# Patient Record
Sex: MALE | Race: ASIAN | Employment: FULL TIME | URBAN - METROPOLITAN AREA
[De-identification: names, ages, dates, MRNs, and addresses within clinical notes are randomized per-mention and may not be internally consistent; named-entity substitution may affect disease eponyms.]

---

## 2017-05-22 ENCOUNTER — HOSPITAL ENCOUNTER (OUTPATIENT)
Age: 57
Discharge: HOME | End: 2017-05-22
Attending: FAMILY MEDICINE
Payer: COMMERCIAL

## 2017-05-22 VITALS
OXYGEN SATURATION: 99 % | TEMPERATURE: 98 F | WEIGHT: 183 LBS | RESPIRATION RATE: 16 BRPM | HEART RATE: 69 BPM | HEIGHT: 70 IN | SYSTOLIC BLOOD PRESSURE: 149 MMHG | DIASTOLIC BLOOD PRESSURE: 82 MMHG | BODY MASS INDEX: 26.2 KG/M2

## 2017-05-22 DIAGNOSIS — K12.0 ORAL APHTHOUS ULCER: Primary | ICD-10-CM

## 2017-05-22 PROCEDURE — 99204 OFFICE O/P NEW MOD 45 MIN: CPT

## 2017-05-22 PROCEDURE — 99213 OFFICE O/P EST LOW 20 MIN: CPT

## 2017-05-22 RX ORDER — VALACYCLOVIR HYDROCHLORIDE 1 G/1
2 TABLET, FILM COATED ORAL 2 TIMES DAILY
Qty: 8 TABLET | Refills: 0 | Status: SHIPPED | OUTPATIENT
Start: 2017-05-22 | End: 2017-05-24

## 2017-05-23 PROBLEM — B00.1 COLD SORE: Status: ACTIVE | Noted: 2017-05-23

## 2017-05-23 NOTE — ED PROVIDER NOTES
Patient Seen in: [facility]    History   Patient presents with:  Mouth Cold Sores (respiratory/integumentary)    Stated Complaint: Mouth sores    HPI    67-year-old male presents for sores in the mouth.   States he has sores to u 177.8 cm (5' 10\")  Wt 83.008 kg  BMI 26.26 kg/m2  SpO2 99%        Physical Exam   Constitutional: He is oriented to person, place, and time. He appears well-developed and well-nourished. HENT:   Head: Normocephalic and atraumatic.    Right Ear: External

## 2017-06-10 ENCOUNTER — HOSPITAL ENCOUNTER (OUTPATIENT)
Dept: GENERAL RADIOLOGY | Facility: HOSPITAL | Age: 57
Discharge: HOME | End: 2017-06-10
Attending: INTERNAL MEDICINE
Payer: COMMERCIAL

## 2017-06-10 DIAGNOSIS — R19.8 GLOBUS PHARYNGEUS: Primary | ICD-10-CM

## 2017-06-10 DIAGNOSIS — R13.10 DYSPHAGIA: ICD-10-CM

## 2017-06-10 PROCEDURE — 92611 MOTION FLUOROSCOPY/SWALLOW: CPT

## 2017-06-10 PROCEDURE — 74230 X-RAY XM SWLNG FUNCJ C+: CPT | Performed by: INTERNAL MEDICINE

## 2017-06-10 NOTE — PROGRESS NOTES
ADULT VIDEOFLUOROSCOPIC SWALLOWING STUDY    Admission Date: 6/10/2017  Evaluation Date: 06/10/2017  Radiologist: Hal Rae    Dear Dr. Adeel Andersen,  This letter is to inform you of Ramiro Nichols Videofluoroscopic Swallowing Study results and/or plan of Agreement/Understanding verbalized and all questions answered to their apparent satisfaction. Patient was concerned that he might be getting this sensation due to stress.  This writer explained that oropharyngeal dysphagia is typically not caused by stress

## 2018-01-27 ENCOUNTER — HOSPITAL ENCOUNTER (OUTPATIENT)
Dept: GENERAL RADIOLOGY | Facility: HOSPITAL | Age: 58
Discharge: HOME | End: 2018-01-27
Attending: PHYSICIAN ASSISTANT
Payer: COMMERCIAL

## 2018-01-27 DIAGNOSIS — R13.10 DYSPHAGIA, UNSPECIFIED TYPE: ICD-10-CM

## 2018-01-27 PROCEDURE — 74220 X-RAY XM ESOPHAGUS 1CNTRST: CPT | Performed by: PHYSICIAN ASSISTANT

## 2018-11-13 PROBLEM — R13.10 DYSPHAGIA: Status: ACTIVE | Noted: 2018-11-13

## 2019-08-03 ENCOUNTER — HOSPITAL ENCOUNTER (OUTPATIENT)
Facility: HOSPITAL | Age: 59
Setting detail: OBSERVATION
Discharge: ANOTHER HOSPITAL | End: 2019-08-05
Attending: EMERGENCY MEDICINE | Admitting: INTERNAL MEDICINE
Payer: COMMERCIAL

## 2019-08-03 ENCOUNTER — APPOINTMENT (OUTPATIENT)
Dept: CT IMAGING | Facility: HOSPITAL | Age: 59
End: 2019-08-03
Attending: EMERGENCY MEDICINE
Payer: COMMERCIAL

## 2019-08-03 DIAGNOSIS — R13.10 DYSPHAGIA, UNSPECIFIED TYPE: Primary | ICD-10-CM

## 2019-08-03 LAB
ALBUMIN SERPL-MCNC: 4.2 G/DL (ref 3.4–5)
ALBUMIN/GLOB SERPL: 1.2 {RATIO} (ref 1–2)
ALP LIVER SERPL-CCNC: 86 U/L (ref 45–117)
ALT SERPL-CCNC: 16 U/L (ref 16–61)
ANION GAP SERPL CALC-SCNC: 6 MMOL/L (ref 0–18)
AST SERPL-CCNC: 11 U/L (ref 15–37)
BASOPHILS # BLD AUTO: 0.05 X10(3) UL (ref 0–0.2)
BASOPHILS NFR BLD AUTO: 0.4 %
BILIRUB SERPL-MCNC: 1.1 MG/DL (ref 0.1–2)
BUN BLD-MCNC: 23 MG/DL (ref 7–18)
BUN/CREAT SERPL: 18.5 (ref 10–20)
CALCIUM BLD-MCNC: 9.6 MG/DL (ref 8.5–10.1)
CHLORIDE SERPL-SCNC: 106 MMOL/L (ref 98–112)
CO2 SERPL-SCNC: 26 MMOL/L (ref 21–32)
CREAT BLD-MCNC: 1.24 MG/DL (ref 0.7–1.3)
DEPRECATED RDW RBC AUTO: 37.4 FL (ref 35.1–46.3)
EOSINOPHIL # BLD AUTO: 0.08 X10(3) UL (ref 0–0.7)
EOSINOPHIL NFR BLD AUTO: 0.6 %
ERYTHROCYTE [DISTWIDTH] IN BLOOD BY AUTOMATED COUNT: 12.3 % (ref 11–15)
GLOBULIN PLAS-MCNC: 3.6 G/DL (ref 2.8–4.4)
GLUCOSE BLD-MCNC: 102 MG/DL (ref 70–99)
GLUCOSE BLD-MCNC: 110 MG/DL (ref 70–99)
HCT VFR BLD AUTO: 45.1 % (ref 39–53)
HGB BLD-MCNC: 16.6 G/DL (ref 13–17.5)
IMM GRANULOCYTES # BLD AUTO: 0.03 X10(3) UL (ref 0–1)
IMM GRANULOCYTES NFR BLD: 0.2 %
LIPASE SERPL-CCNC: 107 U/L (ref 73–393)
LYMPHOCYTES # BLD AUTO: 1.67 X10(3) UL (ref 1–4)
LYMPHOCYTES NFR BLD AUTO: 13 %
M PROTEIN MFR SERPL ELPH: 7.8 G/DL (ref 6.4–8.2)
MCH RBC QN AUTO: 30.9 PG (ref 26–34)
MCHC RBC AUTO-ENTMCNC: 36.8 G/DL (ref 31–37)
MCV RBC AUTO: 83.8 FL (ref 80–100)
MONOCYTES # BLD AUTO: 0.89 X10(3) UL (ref 0.1–1)
MONOCYTES NFR BLD AUTO: 6.9 %
NEUTROPHILS # BLD AUTO: 10.1 X10 (3) UL (ref 1.5–7.7)
NEUTROPHILS # BLD AUTO: 10.1 X10(3) UL (ref 1.5–7.7)
NEUTROPHILS NFR BLD AUTO: 78.9 %
OSMOLALITY SERPL CALC.SUM OF ELEC: 290 MOSM/KG (ref 275–295)
PLATELET # BLD AUTO: 157 10(3)UL (ref 150–450)
POTASSIUM SERPL-SCNC: 3.6 MMOL/L (ref 3.5–5.1)
RBC # BLD AUTO: 5.38 X10(6)UL (ref 4.3–5.7)
SODIUM SERPL-SCNC: 138 MMOL/L (ref 136–145)
TSI SER-ACNC: 0.67 MIU/ML (ref 0.36–3.74)
WBC # BLD AUTO: 12.8 X10(3) UL (ref 4–11)

## 2019-08-03 PROCEDURE — 83690 ASSAY OF LIPASE: CPT | Performed by: EMERGENCY MEDICINE

## 2019-08-03 PROCEDURE — 96360 HYDRATION IV INFUSION INIT: CPT

## 2019-08-03 PROCEDURE — 84443 ASSAY THYROID STIM HORMONE: CPT | Performed by: EMERGENCY MEDICINE

## 2019-08-03 PROCEDURE — 99285 EMERGENCY DEPT VISIT HI MDM: CPT

## 2019-08-03 PROCEDURE — 71260 CT THORAX DX C+: CPT | Performed by: EMERGENCY MEDICINE

## 2019-08-03 PROCEDURE — 85025 COMPLETE CBC W/AUTO DIFF WBC: CPT | Performed by: EMERGENCY MEDICINE

## 2019-08-03 PROCEDURE — 70491 CT SOFT TISSUE NECK W/DYE: CPT | Performed by: EMERGENCY MEDICINE

## 2019-08-03 PROCEDURE — C9113 INJ PANTOPRAZOLE SODIUM, VIA: HCPCS | Performed by: INTERNAL MEDICINE

## 2019-08-03 PROCEDURE — 80053 COMPREHEN METABOLIC PANEL: CPT | Performed by: EMERGENCY MEDICINE

## 2019-08-03 PROCEDURE — 82962 GLUCOSE BLOOD TEST: CPT

## 2019-08-03 RX ORDER — DEXTROSE MONOHYDRATE 25 G/50ML
50 INJECTION, SOLUTION INTRAVENOUS
Status: DISCONTINUED | OUTPATIENT
Start: 2019-08-03 | End: 2019-08-06

## 2019-08-03 RX ORDER — ONDANSETRON 2 MG/ML
4 INJECTION INTRAMUSCULAR; INTRAVENOUS EVERY 6 HOURS PRN
Status: DISCONTINUED | OUTPATIENT
Start: 2019-08-03 | End: 2019-08-06

## 2019-08-03 RX ORDER — SODIUM CHLORIDE 9 MG/ML
INJECTION, SOLUTION INTRAVENOUS CONTINUOUS
Status: DISCONTINUED | OUTPATIENT
Start: 2019-08-03 | End: 2019-08-06

## 2019-08-03 RX ORDER — HEPARIN SODIUM 5000 [USP'U]/ML
5000 INJECTION, SOLUTION INTRAVENOUS; SUBCUTANEOUS EVERY 8 HOURS SCHEDULED
Status: DISCONTINUED | OUTPATIENT
Start: 2019-08-03 | End: 2019-08-06

## 2019-08-03 RX ORDER — SODIUM CHLORIDE 9 MG/ML
INJECTION, SOLUTION INTRAVENOUS CONTINUOUS
Status: ACTIVE | OUTPATIENT
Start: 2019-08-03 | End: 2019-08-03

## 2019-08-03 RX ORDER — METOCLOPRAMIDE HYDROCHLORIDE 5 MG/ML
10 INJECTION INTRAMUSCULAR; INTRAVENOUS EVERY 8 HOURS PRN
Status: DISCONTINUED | OUTPATIENT
Start: 2019-08-03 | End: 2019-08-06

## 2019-08-03 NOTE — ED INITIAL ASSESSMENT (HPI)
This issues has been going on for more than a year, PT has followed up with GI, today is much worse.

## 2019-08-03 NOTE — ED NOTES
Rounded on patient; no distress noted. Denies complaints. Warm blanket provided. Await lab results for further POC.

## 2019-08-03 NOTE — ED INITIAL ASSESSMENT (HPI)
PT states he has been having issues swallowing. When he does swallow, he vomits it back up. Denies fevers or diarrhea.

## 2019-08-03 NOTE — ED PROVIDER NOTES
Patient Seen in: [facility] Emergency Department    History   Patient presents with:  Swallowing Problem (gastrointestinal)    Stated Complaint: trouble swallowing for 2 weeks, worse in the last 24hrs.  unable to eat without *    HPI    Patient is a 77.1 kg   SpO2 97%   BMI 24.39 kg/m²         Physical Exam   Constitutional: He is oriented to person, place, and time. He appears well-developed and well-nourished. HENT:   Head: Normocephalic and atraumatic.    Mouth/Throat: Oropharynx is clear and mois moderate diffuse esophageal dilatation with mild wall thickening of the mid to distal esophagus. Fluid distension of the esophagus. Please correlate clinically for GERD and esophagitis. Achalasia should also be considered.  2. Patchy subtle nonspecific b Clinical Impression:  Dysphagia, unspecified type  (primary encounter diagnosis)    Disposition:  Admit  8/3/2019  7:53 pm    Follow-up:  No follow-up provider specified.       Medications Prescribed:  Current Discharge Medication List              Pres

## 2019-08-03 NOTE — ED NOTES
Rounded on patient; updated with plan of care. Contrast screening form given to patient for completion. Denies needs at this time.

## 2019-08-04 ENCOUNTER — APPOINTMENT (OUTPATIENT)
Dept: CT IMAGING | Facility: HOSPITAL | Age: 59
End: 2019-08-04
Attending: INTERNAL MEDICINE
Payer: COMMERCIAL

## 2019-08-04 ENCOUNTER — ANESTHESIA (OUTPATIENT)
Dept: ENDOSCOPY | Facility: HOSPITAL | Age: 59
End: 2019-08-04
Payer: COMMERCIAL

## 2019-08-04 ENCOUNTER — ANESTHESIA EVENT (OUTPATIENT)
Dept: ENDOSCOPY | Facility: HOSPITAL | Age: 59
End: 2019-08-04
Payer: COMMERCIAL

## 2019-08-04 LAB
ANION GAP SERPL CALC-SCNC: 7 MMOL/L (ref 0–18)
BASOPHILS # BLD AUTO: 0.04 X10(3) UL (ref 0–0.2)
BASOPHILS NFR BLD AUTO: 0.4 %
BUN BLD-MCNC: 20 MG/DL (ref 7–18)
BUN/CREAT SERPL: 20 (ref 10–20)
CALCIUM BLD-MCNC: 8.6 MG/DL (ref 8.5–10.1)
CHLORIDE SERPL-SCNC: 111 MMOL/L (ref 98–112)
CO2 SERPL-SCNC: 24 MMOL/L (ref 21–32)
CREAT BLD-MCNC: 1 MG/DL (ref 0.7–1.3)
DEPRECATED RDW RBC AUTO: 38.2 FL (ref 35.1–46.3)
EOSINOPHIL # BLD AUTO: 0.18 X10(3) UL (ref 0–0.7)
EOSINOPHIL NFR BLD AUTO: 1.9 %
ERYTHROCYTE [DISTWIDTH] IN BLOOD BY AUTOMATED COUNT: 12.4 % (ref 11–15)
GLUCOSE BLD-MCNC: 112 MG/DL (ref 70–99)
GLUCOSE BLD-MCNC: 140 MG/DL (ref 70–99)
GLUCOSE BLD-MCNC: 98 MG/DL (ref 70–99)
GLUCOSE BLD-MCNC: 99 MG/DL (ref 70–99)
HCT VFR BLD AUTO: 42.3 % (ref 39–53)
HGB BLD-MCNC: 15 G/DL (ref 13–17.5)
IMM GRANULOCYTES # BLD AUTO: 0.02 X10(3) UL (ref 0–1)
IMM GRANULOCYTES NFR BLD: 0.2 %
LYMPHOCYTES # BLD AUTO: 1.71 X10(3) UL (ref 1–4)
LYMPHOCYTES NFR BLD AUTO: 17.8 %
MCH RBC QN AUTO: 30.2 PG (ref 26–34)
MCHC RBC AUTO-ENTMCNC: 35.5 G/DL (ref 31–37)
MCV RBC AUTO: 85.3 FL (ref 80–100)
MONOCYTES # BLD AUTO: 0.87 X10(3) UL (ref 0.1–1)
MONOCYTES NFR BLD AUTO: 9 %
NEUTROPHILS # BLD AUTO: 6.8 X10 (3) UL (ref 1.5–7.7)
NEUTROPHILS # BLD AUTO: 6.8 X10(3) UL (ref 1.5–7.7)
NEUTROPHILS NFR BLD AUTO: 70.7 %
OSMOLALITY SERPL CALC.SUM OF ELEC: 297 MOSM/KG (ref 275–295)
PLATELET # BLD AUTO: 144 10(3)UL (ref 150–450)
POTASSIUM SERPL-SCNC: 3.5 MMOL/L (ref 3.5–5.1)
PROCALCITONIN SERPL-MCNC: <0.02 NG/ML
RBC # BLD AUTO: 4.96 X10(6)UL (ref 4.3–5.7)
SODIUM SERPL-SCNC: 142 MMOL/L (ref 136–145)
WBC # BLD AUTO: 9.6 X10(3) UL (ref 4–11)

## 2019-08-04 PROCEDURE — 0D758ZZ DILATION OF ESOPHAGUS, VIA NATURAL OR ARTIFICIAL OPENING ENDOSCOPIC: ICD-10-PCS | Performed by: INTERNAL MEDICINE

## 2019-08-04 PROCEDURE — 88305 TISSUE EXAM BY PATHOLOGIST: CPT | Performed by: INTERNAL MEDICINE

## 2019-08-04 PROCEDURE — 0DB38ZX EXCISION OF LOWER ESOPHAGUS, VIA NATURAL OR ARTIFICIAL OPENING ENDOSCOPIC, DIAGNOSTIC: ICD-10-PCS | Performed by: INTERNAL MEDICINE

## 2019-08-04 PROCEDURE — 82962 GLUCOSE BLOOD TEST: CPT

## 2019-08-04 PROCEDURE — 84145 PROCALCITONIN (PCT): CPT | Performed by: INTERNAL MEDICINE

## 2019-08-04 PROCEDURE — 80048 BASIC METABOLIC PNL TOTAL CA: CPT | Performed by: INTERNAL MEDICINE

## 2019-08-04 PROCEDURE — 74177 CT ABD & PELVIS W/CONTRAST: CPT | Performed by: INTERNAL MEDICINE

## 2019-08-04 PROCEDURE — 85025 COMPLETE CBC W/AUTO DIFF WBC: CPT | Performed by: INTERNAL MEDICINE

## 2019-08-04 RX ORDER — HYDROMORPHONE HYDROCHLORIDE 1 MG/ML
0.4 INJECTION, SOLUTION INTRAMUSCULAR; INTRAVENOUS; SUBCUTANEOUS EVERY 5 MIN PRN
Status: DISCONTINUED | OUTPATIENT
Start: 2019-08-04 | End: 2019-08-04 | Stop reason: HOSPADM

## 2019-08-04 RX ORDER — ISOSORBIDE DINITRATE 10 MG/1
10 TABLET ORAL
Status: DISCONTINUED | OUTPATIENT
Start: 2019-08-04 | End: 2019-08-06

## 2019-08-04 RX ORDER — SODIUM CHLORIDE, SODIUM LACTATE, POTASSIUM CHLORIDE, CALCIUM CHLORIDE 600; 310; 30; 20 MG/100ML; MG/100ML; MG/100ML; MG/100ML
INJECTION, SOLUTION INTRAVENOUS CONTINUOUS
Status: DISCONTINUED | OUTPATIENT
Start: 2019-08-04 | End: 2019-08-04

## 2019-08-04 RX ORDER — PANTOPRAZOLE SODIUM 40 MG/1
40 TABLET, DELAYED RELEASE ORAL
Status: DISCONTINUED | OUTPATIENT
Start: 2019-08-04 | End: 2019-08-06

## 2019-08-04 RX ORDER — DEXTROSE MONOHYDRATE 25 G/50ML
50 INJECTION, SOLUTION INTRAVENOUS
Status: DISCONTINUED | OUTPATIENT
Start: 2019-08-04 | End: 2019-08-04 | Stop reason: HOSPADM

## 2019-08-04 RX ORDER — NALOXONE HYDROCHLORIDE 0.4 MG/ML
80 INJECTION, SOLUTION INTRAMUSCULAR; INTRAVENOUS; SUBCUTANEOUS AS NEEDED
Status: DISCONTINUED | OUTPATIENT
Start: 2019-08-04 | End: 2019-08-04 | Stop reason: HOSPADM

## 2019-08-04 NOTE — PROGRESS NOTES
Patient not tolerating soft diet, verbalized scrambled egg felt like it go stock in his throat. He swallowed several times then it got cleared. Will downgrade diet to full liquid, patient agreeable.   Instructed to sit up straight in bed or sit up in the c

## 2019-08-04 NOTE — PLAN OF CARE
IVF infusing per order. Denies any pain. Ambulated in pickett. NPO. Refused mouth swabs. Scds on. IS teaching done. VSS. On RA. Voiding without difficulty. Instructed to use urinal so output can be measured. Spouse at bedside. Updated on POC.

## 2019-08-04 NOTE — ED NOTES
Patient able to swallow and keep water down, but states \"it feels like it is stuck there. \" Patient speaking clearly.

## 2019-08-04 NOTE — BRIEF OP NOTE
Pre-Operative Diagnosis: dysphagia     Post-Operative Diagnosis:Normal other than esophageal narrowing and dilation     Procedure Performed:   Procedure(s):  Esophagogastroduodenoscopy with BX and dilation to 20mm    Surgeon(s) and Role:     * Ian Leger

## 2019-08-04 NOTE — PLAN OF CARE
Started on Clear liquid diet, may advance to soft diet if able to tolerate it per Dr. Ivett Taylor.   Will plan for CT of the Abdomen and Pelvis later between [id number], per CT scan it is recommended not to give oral contrast too close from the last CT scan due

## 2019-08-04 NOTE — ANESTHESIA PREPROCEDURE EVALUATION
PRE-OP EVALUATION    Patient Name: José Manuel Doty    Pre-op Diagnosis: in patient    Procedure(s):  Esophagogastroduodenoscopy with dilation    Surgeon(s) and Role:     * Yudy Rose MD - Primary    Pre-op vitals reviewed.   Temp: 97.8 °F (36.6 °C Cholecalciferol (VITAMIN D) 2000 units Oral Tab Take by mouth daily.  Disp:  Rfl:    JUSTO 128 5 % Ophthalmic Solution INT 1 GTT RODNEY BID Disp:  Rfl: 4   Timolol Maleate 0.5 % Ophthalmic Solution  Disp:  Rfl:        Allergies: Patient has no known allergies guidelines.           Plan/risks discussed with: patient and spouse                Present on Admission:  **None**

## 2019-08-04 NOTE — PROGRESS NOTES
Patient to GI lab for EGD by Dr. Jodi Contreras. Consent signed for the procedure. Transported to GI lab by stretcher accompanied by his wife.

## 2019-08-04 NOTE — OPERATIVE REPORT
PATIENT NAME: Kashif Maher  MRN: [id number]  DATE OF OPERATION:  8/4/19  REFERRING PHYSICIAN: Dr. Juan Antonio Conroy  Medications:  MAC  PREOPERATIVE DIAGNOSIS   Dysphagia   Abnormal ct of the esophagus   Weight loss  POSTOPERATIVE DIAGNOSIS:  1.  Dilation and spa eosinophilic esophagitis. The entire examined stomach,  including retroflexion view which was remarkable for normal.  The  duodenum was examined to the third portion and was normal.   The scope was then pulled back into the esophogus.     Esophogeal balloon

## 2019-08-04 NOTE — CONSULTS
Codie Monitlla MD    Department of Gastroenterology  [facility]    Mary Nichols Patient Status:  Observation    9/10/1960 MRN [id number]   [facility] 3SW-A Attending MD Lulú Lowery tonsils are symmetric. HYPOPHARYNX:  No mass or other visible lesion. LARYNX:  The vocal cords are symmetric and without mass. SINUSES:  Limited views show no significant fluid or mucosal thickening.     NECK GLANDS:  The parotid, submandibular, an Approved by: Nicky Tapia DO          Study Result     PROCEDURE:  XR ESOPHAGUS (CPT=74220)     TECHNIQUE:  An esophagram was performed with fluoroscopy in the usual manner. COMPARISON:  None.      INDICATIONS:  R13.10 Dysphagia, unspecified     KIRK PRN  •  Metoclopramide HCl (REGLAN) injection 10 mg, 10 mg, Intravenous, Q8H PRN  •  Pantoprazole Sodium (PROTONIX) 40 mg in Sodium Chloride 0.9 % 10 mL IV push, 40 mg, Intravenous, Q24H  •  glucose (DEX4) oral liquid 15 g, 15 g, Oral, Q15 Min PRN **OR** G 170 lb (77.1 kg), SpO2 96 %. General: Appears alert, oriented x3 and in no acute distress. HEENT: Normal. No neck vein distention. Thyroid not enlarged. No lymphadenopathy. CV: S1 and S2 normal.  No murmurs or gallops. Lungs: Clear to auscultation.

## 2019-08-04 NOTE — H&P
PATO Hospitalist H&P       CC: Patient presents with:  Swallowing Problem (gastrointestinal)       PCP: None Pcp    History of Present Illness:  58y/o M with hx of DM presenting to the ED with dysphagia to solids and liquids.  He has been seen by GI physic Never Used    Alcohol use: No       Fam Hx  Family History   Problem Relation Age of Onset   • Heart Attack Father    • Stroke Father    • Diabetes Mother    • Heart Attack Mother        Review of Systems  Comprehensive 10-point ROS reviewed and negative e reduction techniques were used. Dose information is transmitted to the [facility]) NRDR ([address]) which includes the Dose Index Registry.   PATIENT STATED HISTORY:(As transcribed by Technologist)    Patient i diffuse esophageal dilatation with mild wall thickening of the mid to distal esophagus. Fluid distension of the esophagus. Please correlate clinically for GERD and esophagitis. Achalasia should also be considered.  2. Patchy subtle nonspecific bilateral

## 2019-08-04 NOTE — PROGRESS NOTES
Patient back to floor from GI lab post EGD with Dilation under MAC. Received Awake alert and oriented x 4. Post-Operative Diagnosis: esophageal dilation and esophageal narrowing.   Instructed that he will be on Clear liquid diet for now and will have CT o

## 2019-08-04 NOTE — ANESTHESIA POSTPROCEDURE EVALUATION
[facility] Patient Status:  Observation   Age/Gender 63year old male MRN [id number]   [facility] 3SW-A Attending Amy Pozo MD   Hosp Day # 0 PCP None Pcp       Anesthesia Post-op Note    Procedure(s):  María

## 2019-08-05 VITALS
HEART RATE: 58 BPM | TEMPERATURE: 98 F | OXYGEN SATURATION: 99 % | DIASTOLIC BLOOD PRESSURE: 60 MMHG | HEIGHT: 70 IN | WEIGHT: 170 LBS | BODY MASS INDEX: 24.34 KG/M2 | RESPIRATION RATE: 18 BRPM | SYSTOLIC BLOOD PRESSURE: 118 MMHG

## 2019-08-05 LAB
ANION GAP SERPL CALC-SCNC: 5 MMOL/L (ref 0–18)
BUN BLD-MCNC: 16 MG/DL (ref 7–18)
BUN/CREAT SERPL: 16 (ref 10–20)
CALCIUM BLD-MCNC: 8.1 MG/DL (ref 8.5–10.1)
CHLORIDE SERPL-SCNC: 111 MMOL/L (ref 98–112)
CO2 SERPL-SCNC: 24 MMOL/L (ref 21–32)
CREAT BLD-MCNC: 1 MG/DL (ref 0.7–1.3)
GLUCOSE BLD-MCNC: 107 MG/DL (ref 70–99)
GLUCOSE BLD-MCNC: 112 MG/DL (ref 70–99)
GLUCOSE BLD-MCNC: 116 MG/DL (ref 70–99)
GLUCOSE BLD-MCNC: 123 MG/DL (ref 70–99)
OSMOLALITY SERPL CALC.SUM OF ELEC: 292 MOSM/KG (ref 275–295)
POTASSIUM SERPL-SCNC: 3.6 MMOL/L (ref 3.5–5.1)
SODIUM SERPL-SCNC: 140 MMOL/L (ref 136–145)

## 2019-08-05 PROCEDURE — 80048 BASIC METABOLIC PNL TOTAL CA: CPT | Performed by: INTERNAL MEDICINE

## 2019-08-05 PROCEDURE — 82962 GLUCOSE BLOOD TEST: CPT

## 2019-08-05 RX ORDER — ISOSORBIDE DINITRATE 10 MG/1
10 TABLET ORAL
Qty: 90 TABLET | Refills: 2 | Status: SHIPPED | OUTPATIENT
Start: 2019-08-05 | End: 2019-11-29 | Stop reason: ALTCHOICE

## 2019-08-05 NOTE — PROGRESS NOTES
[facility]  GI Progress Note      Shaniqua Devries Patient Status:  Observation    9/10/1960 MRN [id number]   [facility] 3SW-A Attending Jeronimo Beth MD   Hosp Day # 0 PCP None Pcp          SUBJECTIVE:     The patient continues fluid collection, ductal dilatation, or atrophy. SPLEEN:  No enlargement or focal lesion. KIDNEYS:  No mass, obstruction, or calcification. ADRENALS:  No mass or enlargement. AORTA/VASCULAR:  No aneurysm or dissection.     RETROPERITONEUM:  No m

## 2019-08-05 NOTE — DIETARY NOTE
[facility]    NUTRITION INITIAL ASSESSMENT    Pt does not meet malnutrition criteria.     NUTRITION DIAGNOSIS/PROBLEM:  Inadequate energy intake related to physiological causes (achalasia) as evidenced by inability to tolerate solid PO.    NUTRITION IN (25-30 calories per kg)  Protein: 77-92 grams protein/day (1-1.2 grams protein per kg)  Fluid: ~1 ml/kcal or per MD discretion    MONITOR AND EVALUATE/NUTRITION GOALS:    1. PO intake to meet at least 75% patient nutrition prescription  2.  At least 75% int

## 2019-08-05 NOTE — CM/SW NOTE
Call from Dr. Lay Whitten earlier this am that pt will need transfer to tertiary hospital for higher level of care per GI. Per GI, would prefer [facility] or [facility].      I sent insurance inforamation to  and spoke with Jose in the trans

## 2019-08-05 NOTE — PLAN OF CARE
Plan of care update discussed with patient and spouse Jutsina at bedside this am. Still c/o of dysphagia when swallowing, able to tolerate some full liquids only. No cough noted this am. Taking meds with sip of water well.  Per nghia Ordoñez to d/c to [facility]

## 2019-08-05 NOTE — PROGRESS NOTES
[facility] Hospitalist Progress Note     Rafe Dakins Patient Status:  Observation    9/10/1960 MRN [id number]   [facility] 3SW-A Attending Carmita Conn MD   Hosp Day # 0 PCP None Pcp     CC: follow up    SUBJECTIVE:  K[address] Dictated by: Juan Manuel Jade MD on 8/04/2019 at 19:01     Approved by: Juan Manuel Jade MD              Meds:   Scheduled Medication:  • Pantoprazole Sodium  40 mg Oral QAM AC   • isosorbide dinitrate  10 mg Oral TID (Nitrates)   • Heparin Sodium (Porcine)  5, transfer center at [facility] and [address] [place] [address] was discussed with patient, family at bedside, RN.     Outpatient records reviewed confirming patient's medical history and medications.      Time spent: greater than 35 minutes spent in d/w pt/family, coor

## 2019-08-05 NOTE — CM/SW NOTE
Spoke with Jamal Vann at [place] [address] transfer [place]. Pt is financially and clinically approved for transfer. No bed available at this time, hoping for later today or tomorrow.    They will contact unit directly to set up time and get RN report when be

## 2019-08-05 NOTE — PROGRESS NOTES
Rn noted d/c AVS not complete. Rn paged Dr. Patti Morales to complete at [address]. Will update noc Rn. Awaiting call from [facility] when bed becomes available. See Social Work note for d/c instructions and for Rn to call on call ambulance for transfer as per note.  Minoo

## 2019-08-05 NOTE — PLAN OF CARE
ER admit with dysphagia issues. Pt is aaox4, on room air, up SBA, IVF, SCD's refusing Heparin, esophogeal dilation done 8/4 but still having problems swallowing. Pt remains on full liquid diet. Glucose monitored and treated as directed.  Pt up SBA /Ad carolann,

## 2019-08-06 NOTE — DISCHARGE SUMMARY
General Medicine Discharge Summary     Patient ID:  Beny Hayes  63year old  9/10/1960    Admit date: 8/3/2019    Discharge date and time: 8/5/2019 10:50 PM     Attending Physician: Erlin Norris MD    Primary Care Physician: None Pcp     Reason effort  CV: RRR, nL S1/S2, no m/r/g  Abd: soft, NT/ND, BS+  MSK: moving all extremities, no LE edema  Neuro: CN II-XII grossly intact, no focal deficits  Skin: warm, dry.  No rashes/lesions  Psych: cooperative, normal mood/affect    Radiology Reports (last Historical, R-3, CARMEN    prednisoLONE acetate 1 % Ophthalmic Suspension  PLACE 1 DROP INTO BOTH EYES DAILY. , Historical    !!  Brimonidine Tartrate (ALPHAGAN P) 0.1 % Ophthalmic Solution  1 drop., Historical    MetFORMIN HCl 500 MG Oral Tab  Take 500 mg by m

## 2019-08-06 NOTE — PLAN OF CARE
Patient transferred to [facility] room 4629. Report called to Mian Watkins 7715 RN at 2030. Ambulance here at [phone]. Wife going with patient. All personal belongings with patient. Imaging disc sent with patient.

## 2019-08-07 NOTE — CM/SW NOTE
08/07/19 0800   Discharge disposition   Expected discharge disposition Short Term H   Name of [address]   Discharge transportation [address] 8/5/19

## 2019-10-07 PROBLEM — M25.522 LEFT ELBOW PAIN: Status: ACTIVE | Noted: 2019-10-07

## 2019-10-07 PROBLEM — M25.521 RIGHT ELBOW PAIN: Status: ACTIVE | Noted: 2019-10-07

## 2019-10-07 PROBLEM — M79.601 RIGHT ARM PAIN: Status: ACTIVE | Noted: 2019-10-07

## 2019-10-07 PROBLEM — M79.602 LEFT ARM PAIN: Status: ACTIVE | Noted: 2019-10-07

## 2020-11-07 PROBLEM — J30.1 SEASONAL ALLERGIC RHINITIS DUE TO POLLEN: Status: ACTIVE | Noted: 2020-11-07

## 2020-11-07 PROBLEM — B00.1 COLD SORE: Status: RESOLVED | Noted: 2017-05-23 | Resolved: 2020-11-07

## 2020-11-07 PROBLEM — M79.601 RIGHT ARM PAIN: Status: RESOLVED | Noted: 2019-10-07 | Resolved: 2020-11-07

## 2020-11-07 PROBLEM — E11.9 DIABETES TYPE 2, CONTROLLED (HCC): Status: ACTIVE | Noted: 2020-11-07

## 2020-11-07 PROBLEM — M79.602 LEFT ARM PAIN: Status: RESOLVED | Noted: 2019-10-07 | Resolved: 2020-11-07

## 2021-06-16 PROBLEM — M79.641 RIGHT HAND PAIN: Status: ACTIVE | Noted: 2021-06-16

## 2021-06-16 PROBLEM — M79.642 LEFT HAND PAIN: Status: ACTIVE | Noted: 2021-06-16

## 2024-04-04 NOTE — BRIEF OP NOTE
Pre-Operative Diagnosis: dysphagia     Post-Operative Diagnosis: esophageal dilation and esophageal narrowing      Procedure Performed:   Procedure(s):  Esophagogastroduodenoscopy with BX and dilation to 20mm    Surgeon(s) and Role:     * Noel Tierney, Universal Safety Interventions

## 2024-12-14 ENCOUNTER — APPOINTMENT (OUTPATIENT)
Dept: GENERAL RADIOLOGY | Facility: HOSPITAL | Age: 64
End: 2024-12-14
Attending: EMERGENCY MEDICINE
Payer: COMMERCIAL

## 2024-12-14 ENCOUNTER — HOSPITAL ENCOUNTER (EMERGENCY)
Facility: HOSPITAL | Age: 64
Discharge: HOME | End: 2024-12-14
Attending: EMERGENCY MEDICINE
Payer: COMMERCIAL

## 2024-12-14 VITALS
DIASTOLIC BLOOD PRESSURE: 62 MMHG | RESPIRATION RATE: 14 BRPM | SYSTOLIC BLOOD PRESSURE: 121 MMHG | TEMPERATURE: 98 F | OXYGEN SATURATION: 100 % | HEART RATE: 56 BPM

## 2024-12-14 DIAGNOSIS — R07.89 CHEST PAIN, ATYPICAL: Primary | ICD-10-CM

## 2024-12-14 LAB
ALBUMIN SERPL-MCNC: 4.4 G/DL (ref 3.2–4.8)
ALBUMIN/GLOB SERPL: 1.5 {RATIO} (ref 1–2)
ALP LIVER SERPL-CCNC: 83 U/L
ALT SERPL-CCNC: 21 U/L
ANION GAP SERPL CALC-SCNC: 11 MMOL/L (ref 0–18)
AST SERPL-CCNC: 23 U/L (ref ?–34)
BASOPHILS # BLD AUTO: 0.04 X10(3) UL (ref 0–0.2)
BASOPHILS NFR BLD AUTO: 0.5 %
BILIRUB SERPL-MCNC: 0.5 MG/DL (ref 0.2–1.1)
BUN BLD-MCNC: 20 MG/DL (ref 9–23)
CALCIUM BLD-MCNC: 9.9 MG/DL (ref 8.7–10.4)
CHLORIDE SERPL-SCNC: 105 MMOL/L (ref 98–112)
CO2 SERPL-SCNC: 22 MMOL/L (ref 21–32)
CREAT BLD-MCNC: 1.13 MG/DL
EGFRCR SERPLBLD CKD-EPI 2021: 73 ML/MIN/1.73M2 (ref 60–?)
EOSINOPHIL # BLD AUTO: 0.35 X10(3) UL (ref 0–0.7)
EOSINOPHIL NFR BLD AUTO: 4.6 %
ERYTHROCYTE [DISTWIDTH] IN BLOOD BY AUTOMATED COUNT: 13 %
GLOBULIN PLAS-MCNC: 2.9 G/DL (ref 2–3.5)
GLUCOSE BLD-MCNC: 190 MG/DL (ref 70–99)
HCT VFR BLD AUTO: 43.5 %
HGB BLD-MCNC: 15.6 G/DL
IMM GRANULOCYTES # BLD AUTO: 0.02 X10(3) UL (ref 0–1)
IMM GRANULOCYTES NFR BLD: 0.3 %
LYMPHOCYTES # BLD AUTO: 2.32 X10(3) UL (ref 1–4)
LYMPHOCYTES NFR BLD AUTO: 30.4 %
MCH RBC QN AUTO: 30.8 PG (ref 26–34)
MCHC RBC AUTO-ENTMCNC: 35.9 G/DL (ref 31–37)
MCV RBC AUTO: 86 FL
MONOCYTES # BLD AUTO: 0.65 X10(3) UL (ref 0.1–1)
MONOCYTES NFR BLD AUTO: 8.5 %
NEUTROPHILS # BLD AUTO: 4.25 X10 (3) UL (ref 1.5–7.7)
NEUTROPHILS # BLD AUTO: 4.25 X10(3) UL (ref 1.5–7.7)
NEUTROPHILS NFR BLD AUTO: 55.7 %
OSMOLALITY SERPL CALC.SUM OF ELEC: 294 MOSM/KG (ref 275–295)
PLATELET # BLD AUTO: 163 10(3)UL (ref 150–450)
POTASSIUM SERPL-SCNC: 4.2 MMOL/L (ref 3.5–5.1)
PROT SERPL-MCNC: 7.3 G/DL (ref 5.7–8.2)
RBC # BLD AUTO: 5.06 X10(6)UL
SODIUM SERPL-SCNC: 138 MMOL/L (ref 136–145)
TROPONIN I SERPL HS-MCNC: <3 NG/L
TROPONIN I SERPL HS-MCNC: <3 NG/L
WBC # BLD AUTO: 7.6 X10(3) UL (ref 4–11)

## 2024-12-14 PROCEDURE — 71045 X-RAY EXAM CHEST 1 VIEW: CPT | Performed by: EMERGENCY MEDICINE

## 2024-12-14 PROCEDURE — 84484 ASSAY OF TROPONIN QUANT: CPT | Performed by: EMERGENCY MEDICINE

## 2024-12-14 PROCEDURE — 96374 THER/PROPH/DIAG INJ IV PUSH: CPT

## 2024-12-14 PROCEDURE — 93010 ELECTROCARDIOGRAM REPORT: CPT

## 2024-12-14 PROCEDURE — 99285 EMERGENCY DEPT VISIT HI MDM: CPT

## 2024-12-14 PROCEDURE — 93005 ELECTROCARDIOGRAM TRACING: CPT

## 2024-12-14 PROCEDURE — 99284 EMERGENCY DEPT VISIT MOD MDM: CPT

## 2024-12-14 PROCEDURE — 80053 COMPREHEN METABOLIC PANEL: CPT | Performed by: EMERGENCY MEDICINE

## 2024-12-14 PROCEDURE — 85025 COMPLETE CBC W/AUTO DIFF WBC: CPT | Performed by: EMERGENCY MEDICINE

## 2024-12-14 RX ORDER — KETOROLAC TROMETHAMINE 15 MG/ML
15 INJECTION, SOLUTION INTRAMUSCULAR; INTRAVENOUS ONCE
Status: COMPLETED | OUTPATIENT
Start: 2024-12-14 | End: 2024-12-14

## 2024-12-14 RX ORDER — ASPIRIN 81 MG/1
324 TABLET, CHEWABLE ORAL ONCE
Status: COMPLETED | OUTPATIENT
Start: 2024-12-14 | End: 2024-12-14

## 2024-12-15 LAB
ATRIAL RATE: 68 BPM
P AXIS: 74 DEGREES
P-R INTERVAL: 158 MS
Q-T INTERVAL: 386 MS
QRS DURATION: 84 MS
QTC CALCULATION (BEZET): 410 MS
R AXIS: 24 DEGREES
T AXIS: 55 DEGREES
VENTRICULAR RATE: 68 BPM

## 2024-12-15 NOTE — ED PROVIDER NOTES
Patient Seen in: [facility] Emergency Department      History     Chief Complaint   Patient presents with    Chest Pain Angina     Stated Complaint: CP    Subjective:   HPI      64-year-old male comes to the hospital complaint of having difficulty chest pain.  This began after eating started about 15 to 20 minutes ago.  Is no history of having this in the past.  He denies any headaches or dizziness.  He has no shortness of breath.  He denies any nausea.  No diaphoresis.  The pain does not radiate.  There is nothing specific makes it better or worse he is denying any other complaints at this time.  He has no history of having heart disease in the past.    Objective:     Past Medical History:    Pre-diabetes    Problems with swallowing              Past Surgical History:   Procedure Laterality Date    Eye surgery                  Social History     Socioeconomic History    Marital status:    Tobacco Use    Smoking status: Never    Smokeless tobacco: Never   Substance and Sexual Activity    Alcohol use: No    Drug use: No                  Physical Exam     ED Triage Vitals   BP 12/14/24 1805 137/85   Pulse 12/14/24 1805 67   Resp 12/14/24 1805 18   Temp 12/14/24 1805 97.6 °F (36.4 °C)   Temp src 12/14/24 1805 Temporal   SpO2 12/14/24 1805 100 %   O2 Device 12/14/24 1815 None (Room air)       Current Vitals:   Vital Signs  BP: 124/62  Pulse: 58  Resp: 15  Temp: 97.6 °F (36.4 °C)  Temp src: Temporal  MAP (mmHg): 81    Oxygen Therapy  SpO2: 100 %  O2 Device: None (Room air)        Physical Exam  HEENT; NCAT, EOMI, throat clear, neck supple, no LAD, no JVD  Heart S1S2 RRR left anterior chest wall tenderness is noted  lungs: CTAB  abd: Soft NT, ND,  NABS without rebound or guarding  Ext no C/C/E    ED Course     Labs Reviewed   COMP METABOLIC PANEL (14) - Abnormal; Notable for the following components:       Result Value    Glucose 190 (*)     All other components within normal limits   TROPONIN I HIGH  SENSITIVITY - Normal   TROPONIN I HIGH SENSITIVITY - Normal   CBC WITH DIFFERENTIAL WITH PLATELET   TROPONIN I HIGH SENSITIVITY   RAINBOW DRAW LAVENDER   RAINBOW DRAW LIGHT GREEN   RAINBOW DRAW BLUE     EKG    Rate, intervals and axes as noted on EKG Report.  Rate: 68  Rhythm: Sinus Rhythm  Reading: , QRS of 84, patient is normal sinus rhythm without ischemic change.           ED Course as of 12/14/24 2039  ------------------------------------------------------------  Time: 12/14 2037  Comment: While here the patient had a normal CBC.  The patient's CMP was unremarkable with a glucose of 190.  He had 2 sets of cardiac enzymes 2 hours apart that were both negative.  The patient had a chest x-ray on that I personally interpreted showing no acute cardiopulmonary process.  I reviewed the radiology report as well.       XR CHEST AP PORTABLE  (CPT=71045)    Result Date: 12/14/2024  PROCEDURE:  XR CHEST AP PORTABLE  (CPT=71045)  TECHNIQUE:  AP chest radiograph was obtained.  COMPARISON:  None.  INDICATIONS:  CP  PATIENT STATED HISTORY: (As transcribed by Technologist)  Patient offered no additional history at this time.    FINDINGS:  Lung volumes are low.  There is dependent density in lower lungs which could represent atypical pneumonia or atelectasis.  Heart size is within normal limits.  Aorta is atherosclerotic.  Chest wall structures are unremarkable.            CONCLUSION:  There is increased density lung bases bilaterally which is favored to represent dependent atelectasis although atypical pneumonia could have this appearance.   LOCATION:  [facility]      Dictated by (CST): Lance Sanders MD on 12/14/2024 at 6:53 PM     Finalized by (CST): Lance Sanders MD on 12/14/2024 at 6:54 PM        Medications   ketorolac (Toradol) 15 MG/ML injection 15 mg (15 mg Intravenous Given 12/14/24 1814)   aspirin chewable tab 324 mg (324 mg Oral Given 12/14/24 1814)            MDM      Differential diagnosis included cardiac  involvement, pneumonia, pneumothorax but not limited to these.  Patient's chest pain is atypical.  He has had 2 sets of cardiac enzymes 2 hours apart that were both negative.  He has a low Mace score at this time the patient will be discharged home for further outpatient management care.    Patient was screened and evaluated during this visit.   As a treating physician attending to the patient, I determined, within reasonable clinical confidence and prior to discharge, that an emergency medical condition was not or was no longer present.  There was no indication for further evaluation, treatment or admission on an emergency basis.       The usual and customary discharge instuctions were discussed given the patient's ER course.  We discussed signs and symptoms that should prompt the patient's immediate return to the emergency department.   Reasonable over the counter and prescription treatment options and Physician follow up plan was discussed.       The patient is discharged in good condition.     This note was prepared using Dragon Medical voice recognition dictation software.  As a result errors may occur.  When identified to these areas have been corrected.  While every attempt is made to correct errors during dictation discrepancies may still exist.  Please contact if there are any errors.          Medical Decision Making      Disposition and Plan     Clinical Impression:  1. Chest pain, atypical         Disposition:  Discharge  12/14/2024  8:38 pm    Follow-up:  George Schrader MD  [address] [address] [phone]    Schedule an appointment as soon as possible for a visit in 3 day(s)            Medications Prescribed:  Current Discharge Medication List              Supplementary Documentation:

## 2025-02-01 ENCOUNTER — HOSPITAL ENCOUNTER (INPATIENT)
Facility: HOSPITAL | Age: 65
LOS: 1 days | Discharge: HOME | End: 2025-02-02
Attending: EMERGENCY MEDICINE | Admitting: HOSPITALIST
Payer: COMMERCIAL

## 2025-02-01 ENCOUNTER — ANESTHESIA EVENT (OUTPATIENT)
Dept: ENDOSCOPY | Facility: HOSPITAL | Age: 65
End: 2025-02-01
Payer: COMMERCIAL

## 2025-02-01 DIAGNOSIS — R13.10 DYSPHAGIA, UNSPECIFIED TYPE: Primary | ICD-10-CM

## 2025-02-01 DIAGNOSIS — R13.10 DYSPHAGIA: ICD-10-CM

## 2025-02-01 DIAGNOSIS — H40.1123 PRIMARY OPEN ANGLE GLAUCOMA (POAG) OF LEFT EYE, SEVERE STAGE: ICD-10-CM

## 2025-02-01 DIAGNOSIS — H40.32X3 TRAUMATIC GLAUCOMA, LEFT, SEVERE STAGE: ICD-10-CM

## 2025-02-01 LAB
ALBUMIN SERPL-MCNC: 4.5 G/DL (ref 3.2–4.8)
ALBUMIN/GLOB SERPL: 1.5 {RATIO} (ref 1–2)
ALP LIVER SERPL-CCNC: 78 U/L
ALT SERPL-CCNC: 23 U/L
ANION GAP SERPL CALC-SCNC: 7 MMOL/L (ref 0–18)
AST SERPL-CCNC: 20 U/L (ref ?–34)
BASOPHILS # BLD AUTO: 0.04 X10(3) UL (ref 0–0.2)
BASOPHILS NFR BLD AUTO: 0.6 %
BILIRUB SERPL-MCNC: 1.2 MG/DL (ref 0.2–1.1)
BUN BLD-MCNC: 11 MG/DL (ref 9–23)
CALCIUM BLD-MCNC: 9.7 MG/DL (ref 8.7–10.6)
CHLORIDE SERPL-SCNC: 103 MMOL/L (ref 98–112)
CO2 SERPL-SCNC: 30 MMOL/L (ref 21–32)
CREAT BLD-MCNC: 1.03 MG/DL
EGFRCR SERPLBLD CKD-EPI 2021: 82 ML/MIN/1.73M2 (ref 60–?)
EOSINOPHIL # BLD AUTO: 0.21 X10(3) UL (ref 0–0.7)
EOSINOPHIL NFR BLD AUTO: 3.2 %
ERYTHROCYTE [DISTWIDTH] IN BLOOD BY AUTOMATED COUNT: 12.7 %
EST. AVERAGE GLUCOSE BLD GHB EST-MCNC: 154 MG/DL (ref 68–126)
GLOBULIN PLAS-MCNC: 3 G/DL (ref 2–3.5)
GLUCOSE BLD-MCNC: 112 MG/DL (ref 70–99)
GLUCOSE BLD-MCNC: 136 MG/DL (ref 70–99)
GLUCOSE BLD-MCNC: 85 MG/DL (ref 70–99)
GLUCOSE BLD-MCNC: 89 MG/DL (ref 70–99)
HBA1C MFR BLD: 7 % (ref ?–5.7)
HCT VFR BLD AUTO: 44.2 %
HGB BLD-MCNC: 16.3 G/DL
IMM GRANULOCYTES # BLD AUTO: 0.01 X10(3) UL (ref 0–1)
IMM GRANULOCYTES NFR BLD: 0.2 %
LYMPHOCYTES # BLD AUTO: 1.55 X10(3) UL (ref 1–4)
LYMPHOCYTES NFR BLD AUTO: 24 %
MCH RBC QN AUTO: 30.8 PG (ref 26–34)
MCHC RBC AUTO-ENTMCNC: 36.9 G/DL (ref 31–37)
MCV RBC AUTO: 83.6 FL
MONOCYTES # BLD AUTO: 0.42 X10(3) UL (ref 0.1–1)
MONOCYTES NFR BLD AUTO: 6.5 %
NEUTROPHILS # BLD AUTO: 4.24 X10 (3) UL (ref 1.5–7.7)
NEUTROPHILS # BLD AUTO: 4.24 X10(3) UL (ref 1.5–7.7)
NEUTROPHILS NFR BLD AUTO: 65.5 %
OSMOLALITY SERPL CALC.SUM OF ELEC: 291 MOSM/KG (ref 275–295)
PLATELET # BLD AUTO: 169 10(3)UL (ref 150–450)
POTASSIUM SERPL-SCNC: 4 MMOL/L (ref 3.5–5.1)
PROT SERPL-MCNC: 7.5 G/DL (ref 5.7–8.2)
RBC # BLD AUTO: 5.29 X10(6)UL
SODIUM SERPL-SCNC: 140 MMOL/L (ref 136–145)
WBC # BLD AUTO: 6.5 X10(3) UL (ref 4–11)

## 2025-02-01 PROCEDURE — 96361 HYDRATE IV INFUSION ADD-ON: CPT

## 2025-02-01 PROCEDURE — 82962 GLUCOSE BLOOD TEST: CPT

## 2025-02-01 PROCEDURE — 96360 HYDRATION IV INFUSION INIT: CPT

## 2025-02-01 PROCEDURE — 99285 EMERGENCY DEPT VISIT HI MDM: CPT

## 2025-02-01 PROCEDURE — 85025 COMPLETE CBC W/AUTO DIFF WBC: CPT | Performed by: EMERGENCY MEDICINE

## 2025-02-01 PROCEDURE — 80053 COMPREHEN METABOLIC PANEL: CPT | Performed by: EMERGENCY MEDICINE

## 2025-02-01 PROCEDURE — 83036 HEMOGLOBIN GLYCOSYLATED A1C: CPT | Performed by: HOSPITALIST

## 2025-02-01 RX ORDER — SILICONE ADHESIVE 1.5" X 3"
1 SHEET (EA) TOPICAL NIGHTLY
Status: DISCONTINUED | OUTPATIENT
Start: 2025-02-01 | End: 2025-02-02

## 2025-02-01 RX ORDER — BRIMONIDINE TARTRATE 2 MG/ML
1 SOLUTION/ DROPS OPHTHALMIC 2 TIMES DAILY
Status: DISCONTINUED | OUTPATIENT
Start: 2025-02-01 | End: 2025-02-02

## 2025-02-01 RX ORDER — TIMOLOL MALEATE 5 MG/ML
1 SOLUTION/ DROPS OPHTHALMIC DAILY
Status: DISCONTINUED | OUTPATIENT
Start: 2025-02-02 | End: 2025-02-01

## 2025-02-01 RX ORDER — SODIUM CHLORIDE 9 MG/ML
INJECTION, SOLUTION INTRAVENOUS CONTINUOUS
Status: DISCONTINUED | OUTPATIENT
Start: 2025-02-01 | End: 2025-02-02

## 2025-02-01 RX ORDER — FLUTICASONE PROPIONATE 50 MCG
2 SPRAY, SUSPENSION (ML) NASAL DAILY
Status: DISCONTINUED | OUTPATIENT
Start: 2025-02-01 | End: 2025-02-02

## 2025-02-01 RX ORDER — SODIUM CHLORIDE 9 MG/ML
INJECTION, SOLUTION INTRAVENOUS CONTINUOUS
Status: ACTIVE | OUTPATIENT
Start: 2025-02-01 | End: 2025-02-01

## 2025-02-01 RX ORDER — DEXTROSE MONOHYDRATE 25 G/50ML
50 INJECTION, SOLUTION INTRAVENOUS
Status: DISCONTINUED | OUTPATIENT
Start: 2025-02-01 | End: 2025-02-02

## 2025-02-01 RX ORDER — ONDANSETRON 2 MG/ML
4 INJECTION INTRAMUSCULAR; INTRAVENOUS EVERY 6 HOURS PRN
Status: DISCONTINUED | OUTPATIENT
Start: 2025-02-01 | End: 2025-02-02

## 2025-02-01 RX ORDER — OMEPRAZOLE 20 MG/1
40 CAPSULE, DELAYED RELEASE ORAL
COMMUNITY
End: 2025-02-02

## 2025-02-01 RX ORDER — HEPARIN SODIUM 5000 [USP'U]/ML
5000 INJECTION, SOLUTION INTRAVENOUS; SUBCUTANEOUS EVERY 8 HOURS SCHEDULED
Status: DISCONTINUED | OUTPATIENT
Start: 2025-02-01 | End: 2025-02-02

## 2025-02-01 RX ORDER — TIMOLOL MALEATE 5 MG/ML
1 SOLUTION/ DROPS OPHTHALMIC 2 TIMES DAILY
Status: DISCONTINUED | OUTPATIENT
Start: 2025-02-01 | End: 2025-02-02

## 2025-02-01 RX ORDER — ACETAMINOPHEN 500 MG
500 TABLET ORAL EVERY 4 HOURS PRN
Status: DISCONTINUED | OUTPATIENT
Start: 2025-02-01 | End: 2025-02-02

## 2025-02-01 RX ORDER — NICOTINE POLACRILEX 4 MG
30 LOZENGE BUCCAL
Status: DISCONTINUED | OUTPATIENT
Start: 2025-02-01 | End: 2025-02-02

## 2025-02-01 RX ORDER — PREDNISOLONE ACETATE 10 MG/ML
1 SUSPENSION/ DROPS OPHTHALMIC DAILY
Status: DISCONTINUED | OUTPATIENT
Start: 2025-02-02 | End: 2025-02-02

## 2025-02-01 RX ORDER — PROCHLORPERAZINE EDISYLATE 5 MG/ML
5 INJECTION INTRAMUSCULAR; INTRAVENOUS EVERY 8 HOURS PRN
Status: DISCONTINUED | OUTPATIENT
Start: 2025-02-01 | End: 2025-02-02

## 2025-02-01 RX ORDER — NICOTINE POLACRILEX 4 MG
15 LOZENGE BUCCAL
Status: DISCONTINUED | OUTPATIENT
Start: 2025-02-01 | End: 2025-02-02

## 2025-02-01 NOTE — PLAN OF CARE
Patient arrived to unit around approx 1300 this PM. Oriented to unit. Admission navigator completed. PTA medication list reviewed and completed. Patient alert and oriented x4. Spo2 maintained on RA. NSR/SB on tele. Patient continent of bowel and bladder. Plan for EGD tomorrow, 2/2 per GI. Denies pain at this time. Skin intact. Patient is up with standby assist in the room. Updated on POC. Needs met.     Approx 1815: Report called to JERRY Damon. Notified patient of transfer. Verbalized understanding.     Problem: Patient/Family Goals  Goal: Patient/Family Long Term Goal  Description: Patient's Long Term Goal: Stay out of the hospital when discharged     Interventions:  - Attend follow up appointments as needed  - Follow medication regimen at home  - See additional Care Plan goals for specific interventions  Outcome: Progressing  Goal: Patient/Family Short Term Goal  Description: Patient's Short Term Goal: To go home    Interventions:   - EGD 2/2  - NPO  - IV fluids  - See additional Care Plan goals for specific interventions  Outcome: Progressing     Problem: GASTROINTESTINAL - ADULT  Goal: Minimal or absence of nausea and vomiting  Description: INTERVENTIONS:  - Maintain adequate hydration with IV or PO as ordered and tolerated  - Nasogastric tube to low intermittent suction as ordered  - Evaluate effectiveness of ordered antiemetic medications  - Provide nonpharmacologic comfort measures as appropriate  - Advance diet as tolerated, if ordered  - Obtain nutritional consult as needed  - Evaluate fluid balance  Outcome: Progressing     Problem: Impaired Swallowing  Goal: Minimize aspiration risk  Description: Interventions:  - Patient should be alert and upright for all feedings (90 degrees preferred)  - Offer food and liquids at a slow rate  - No straws  - Encourage small bites of food and small sips of liquid  - Offer pills one at a time, crush or deliver with applesauce as needed  - Discontinue feeding and notify MD  (or speech pathologist) if coughing or persistent throat clearing or wet/gurgly vocal quality is noted  Outcome: Progressing

## 2025-02-01 NOTE — ANESTHESIA PREPROCEDURE EVALUATION
PRE-OP EVALUATION    Patient Name: Ramiro Nichols    Admit Diagnosis: Dysphagia, unspecified type [R13.10]    Pre-op Diagnosis: Dysphagia [R13.10]    ESOPHAGOGASTRODUODENOSCOPY (EGD)    Anesthesia Procedure: ESOPHAGOGASTRODUODENOSCOPY (EGD)    Surgeons and Role:     * Leon Motley, DO - Primary    Pre-op vitals reviewed.  Temp: 97.7 °F (36.5 °C)  Pulse: 52  Resp: 16  BP: 137/87  SpO2: 100 %  Body mass index is 24.51 kg/m².    Current medications reviewed.  Hospital Medications:   [COMPLETED] sodium chloride 0.9 % IV bolus 1,000 mL  1,000 mL Intravenous Once    sodium chloride 0.9% infusion   Intravenous Continuous    fluticasone propionate (Flonase) 50 MCG/ACT nasal suspension 2 spray  2 spray Nasal Daily    glucose (Dex4) 15 GM/59ML oral liquid 15 g  15 g Oral Q15 Min PRN    Or    glucose (Glutose) 40% oral gel 15 g  15 g Oral Q15 Min PRN    Or    glucose-vitamin C (Dex-4) chewable tab 4 tablet  4 tablet Oral Q15 Min PRN    Or    dextrose 50% injection 50 mL  50 mL Intravenous Q15 Min PRN    Or    glucose (Dex4) 15 GM/59ML oral liquid 30 g  30 g Oral Q15 Min PRN    Or    glucose (Glutose) 40% oral gel 30 g  30 g Oral Q15 Min PRN    Or    glucose-vitamin C (Dex-4) chewable tab 8 tablet  8 tablet Oral Q15 Min PRN    sodium chloride 0.9% infusion   Intravenous Continuous    heparin (Porcine) 5000 UNIT/ML injection 5,000 Units  5,000 Units Subcutaneous Q8H KEENAN    acetaminophen (Tylenol Extra Strength) tab 500 mg  500 mg Oral Q4H PRN    ondansetron (Zofran) 4 MG/2ML injection 4 mg  4 mg Intravenous Q6H PRN    prochlorperazine (Compazine) 10 MG/2ML injection 5 mg  5 mg Intravenous Q8H PRN    insulin aspart (NovoLOG) 100 Units/mL FlexPen 1-5 Units  1-5 Units Subcutaneous TID AC and HS    pantoprazole (Protonix) 40 mg in sodium chloride 0.9% PF 10 mL IV push  40 mg Intravenous Q24H       Outpatient Medications:   Prescriptions Prior to Admission[1]    Allergies: Patient has no known allergies.      Anesthesia  Evaluation    Patient summary reviewed.    Anesthetic Complications  (-) history of anesthetic complications         GI/Hepatic/Renal    Negative GI/hepatic/renal ROS.                             Cardiovascular    Negative cardiovascular ROS.  ECG reviewed.                                                 Endo/Other      (+) diabetes and well controlled, type 2, not using insulin                  (+) arthritis       Pulmonary    Negative pulmonary ROS.                       Neuro/Psych    Negative neuro/psych ROS.                             Diabetes type 2, controlled (HCC) Dysphagia  Dysphagia, unspecified type Globus pharyngeus  Laryngopharyngeal reflux Osteoarthrosis, unspecified whether generalized or localized, lower leg  Seasonal allergic rhinitis due to pollen               Past Surgical History:   Procedure Laterality Date    Eye surgery       Social History     Socioeconomic History    Marital status:    Tobacco Use    Smoking status: Never    Smokeless tobacco: Never   Substance and Sexual Activity    Alcohol use: No    Drug use: No     History   Drug Use No     Available pre-op labs reviewed.  Lab Results   Component Value Date    WBC 6.5 02/01/2025    RBC 5.29 02/01/2025    HGB 16.3 02/01/2025    HCT 44.2 02/01/2025    MCV 83.6 02/01/2025    MCH 30.8 02/01/2025    MCHC 36.9 02/01/2025    RDW 12.7 02/01/2025    .0 02/01/2025     Lab Results   Component Value Date     02/01/2025    K 4.0 02/01/2025     02/01/2025    CO2 30.0 02/01/2025    BUN 11 02/01/2025    CREATSERUM 1.03 02/01/2025     (H) 02/01/2025    CA 9.7 02/01/2025            Airway      Mallampati: II  Mouth opening: >3 FB  TM distance: > 6 cm  Neck ROM: full Cardiovascular      Rhythm: regular  Rate: normal     Dental             Pulmonary      Breath sounds clear to auscultation bilaterally.               Other findings              ASA: 3   Plan: MAC  NPO status verified and patient meets  guidelines.    Post-procedure pain management plan discussed with surgeon and patient.      Plan/risks discussed with: patient                Present on Admission:  **None**             [1]   Medications Prior to Admission   Medication Sig Dispense Refill Last Dose/Taking    omeprazole 20 MG Oral Capsule Delayed Release Take 2 capsules (40 mg total) by mouth every morning before breakfast.   2/1/2025 at  7:00 AM    metFORMIN  MG Oral Tablet 24 Hr Take 1 tablet (500 mg total) by mouth 2 (two) times daily with meals. 60 tablet 3 1/31/2025 Bedtime    atorvastatin 40 MG Oral Tab Take 1 tablet (40 mg total) by mouth daily. 90 tablet 0 1/31/2025 at  9:00 PM    brimonidine Tartrate 0.2 % Ophthalmic Solution Place 1 drop into both eyes 2 (two) times daily. 15 mL 3 2/1/2025 at  7:00 AM    JUSTO 128 5 % Ophthalmic Solution Place 1 drop into the left eye every evening. 30 mL 0 1/31/2025 at  9:00 PM    prednisoLONE 1 % Ophthalmic Suspension Place 1 drop into the left eye daily. 15 mL 3 2/1/2025 at  7:00 AM    timolol 0.5 % Ophthalmic Solution Instill 1 drop into both eyes two times daily 15 mL 3 2/1/2025 at  7:00 AM    VITAMIN D3, CHOLECALCIFEROL, 50 MCG (2000 UT) Oral Tab TAKE 1 TABLET BY MOUTH EVERY DAY 90 tablet 0 Past Week    Loratadine-Pseudoephedrine ER (CLARITIN-D 24 HOUR)  MG Oral Tablet 24 Hr Take 1 tablet by mouth daily. 30 tablet 0 Past Week    Carboxymethylcellulose Sodium (REFRESH TEARS) 0.5 % Ophthalmic Solution Place 1 drop into both eyes 2 (two) times daily. (Patient taking differently: Place 1 drop into both eyes as needed.) 30 mL 3 2/1/2025 at  7:00 AM    Accu-Chek FastClix Lancets Does not apply Misc Test blood glucose once daily 1 Box 12     Glucose Blood (ACCU-CHEK GUIDE) In Vitro Strip Test blood glucose once daily 50 strip 12

## 2025-02-01 NOTE — ED QUICK NOTES
Orders for admission, patient is aware of plan and ready to go upstairs. Any questions, please call ED RN ADALBERTO at extension [phone].     Patient Covid vaccination status: Fully vaccinated     COVID Test Ordered in ED: None    COVID Suspicion at Admission: N/A    Running Infusions:  None    Mental Status/LOC at time of transport: A&OX4    Other pertinent information:   CIWA score: N/A   NIH score:  N/A

## 2025-02-01 NOTE — H&P
[facility]   part of [facility]    History and Physical     Ramiro Nichols Patient Status:  Emergency    9/10/1960 MRN [id number]   Location [facility] EMERGENCY DEPARTMENT Attending Jayesh Lee MD   Hosp Day # 0 PCP George Schrader MD     Chief Complaint: Dysphagia    History of Present Illness: Ramiro Nichols is a 64 year old male with history of diabetes, dysphagia presenting with dysphagia.  Patient says that about 2 and half weeks ago he started having difficulty with swallowing.  He was having issues with both solids and liquids.  He had had episodes of vomiting as result.  He is actually scheduled for a EGD with dilation.  As patient has had esophageal strictures in the past.  This was delayed given some discomfort in the chest requiring preoperative clearance with a stress test.  Patient did have a stress test done yesterday which was negative.  Patient's symptoms are significantly worse so they came to the emergency room for further evaluation treatment.  Patient denies any other positive review of systems.    Past Medical History:  Past Medical History:    Pre-diabetes    Problems with swallowing       Past Surgical History:   Past Surgical History:   Procedure Laterality Date    Eye surgery         Social History:  reports that he has never smoked. He has never used smokeless tobacco. He reports that he does not drink alcohol and does not use drugs.no illegal drugs, , 2 children, working, no cane or walker    Family History:   Family History   Problem Relation Age of Onset    Heart Attack Father     Stroke Father     Diabetes Mother     Heart Attack Mother    Mother passed  Father passed    Allergies: Allergies[1]    Medications:  Medications Ordered Prior to Encounter[2]    Review of Systems:   A comprehensive 14 point review of systems was completed.    Pertinent positives and negatives noted in the HPI.    Physical Exam:    /75   Pulse 54   Temp (!)  96.2 °F (35.7 °C) (Temporal)   Resp 18   Ht 5' 11\" (1.803 m)   Wt 170 lb (77.1 kg)   SpO2 100%   BMI 23.71 kg/m²   General: No acute distress. Alert and oriented x 3.  HEENT: Normocephalic atraumatic. Moist mucous membranes. EOM-I.   Neck:  No JVD. No carotid bruits.  Respiratory: Clear to auscultation bilaterally. No wheezes. No crackles  Cardiovascular: S1, S2. Regular rate and rhythm. No murmurs  Chest and Back: No tenderness or deformity.  Abdomen: Soft, nontender, nondistended.  Positive bowel sounds. No rebound, guarding   Neurologic: No focal neurological deficits. CNII-XII grossly intact. Sensation and strength intact  Musculoskeletal: Moves all extremities.  Extremities: No edema or tenderness  Integument: No new rashes or lesions.   Psychiatric: Appropriate mood and affect.      Diagnostic Data:      Labs:  Recent Labs   Lab 02/01/25  0825   WBC 6.5   HGB 16.3   MCV 83.6   .0       Recent Labs   Lab 02/01/25  0825   *   BUN 11   CREATSERUM 1.03   CA 9.7   ALB 4.5      K 4.0      CO2 30.0   ALKPHO 78   AST 20   ALT 23   BILT 1.2*   TP 7.5       Estimated Creatinine Clearance: 78.2 mL/min (based on SCr of 1.03 mg/dL).    No results for input(s): \"PTP\", \"INR\" in the last 168 hours.    No results for input(s): \"TROP\", \"CK\" in the last 168 hours.    Imaging: Imaging data reviewed in Epic.      ASSESSMENT / PLAN:   64 year old male with history of diabetes, dysphagia presenting with dysphagia.     Dysphagia  -hx of esophageal stricture  -preop clearance, stress echo yesterday wnl  -GI consulted  -Iv PPI  -likely EGD with dilation tomorrow.   -clears ok per GI and then NPO at midnight     Type 2 DM  -hold home oral meds  -low dose insulin sliding scale    Quality:  DVT Prophylaxis: scd, heparin sq  CODE status:   Campuzano: none    Plan of care discussed with patient and staff    Dispo: no discharge    MD Krysta Morejon Hospitalist  [phone]                           [1] No  Known Allergies  [2]   Current Facility-Administered Medications on File Prior to Encounter   Medication Dose Route Frequency Provider Last Rate Last Admin    [COMPLETED] ketorolac (Toradol) 15 MG/ML injection 15 mg  15 mg Intravenous Once George Gutierrez MD   15 mg at 12/14/24 1814    [COMPLETED] aspirin chewable tab 324 mg  324 mg Oral Once George Gutierrez MD   324 mg at 12/14/24 1814     Current Outpatient Medications on File Prior to Encounter   Medication Sig Dispense Refill    metFORMIN  MG Oral Tablet 24 Hr Take 1 tablet (500 mg total) by mouth 2 (two) times daily with meals. 60 tablet 3    atorvastatin 40 MG Oral Tab Take 1 tablet (40 mg total) by mouth daily. 90 tablet 0    brimonidine Tartrate 0.2 % Ophthalmic Solution Place 1 drop into both eyes 2 (two) times daily. 15 mL 3    JUSTO 128 5 % Ophthalmic Solution Place 1 drop into the left eye every evening. 30 mL 0    prednisoLONE 1 % Ophthalmic Suspension Place 1 drop into the left eye daily. 15 mL 3    timolol 0.5 % Ophthalmic Solution Instill 1 drop into both eyes two times daily 15 mL 3    FLUTICASONE PROPIONATE 50 MCG/ACT Nasal Suspension SPRAY 2 SPRAYS INTO EACH NOSTRIL EVERY DAY 48 mL 1    VITAMIN D3, CHOLECALCIFEROL, 50 MCG (2000 UT) Oral Tab TAKE 1 TABLET BY MOUTH EVERY DAY 90 tablet 0    Loratadine-Pseudoephedrine ER (CLARITIN-D 24 HOUR)  MG Oral Tablet 24 Hr Take 1 tablet by mouth daily. 30 tablet 0    Carboxymethylcellulose Sodium (REFRESH TEARS) 0.5 % Ophthalmic Solution Place 1 drop into both eyes 2 (two) times daily. 30 mL 3    Accu-Chek FastClix Lancets Does not apply Misc Test blood glucose once daily 1 Box 12    Glucose Blood (ACCU-CHEK GUIDE) In Vitro Strip Test blood glucose once daily 50 strip 12

## 2025-02-01 NOTE — ED INITIAL ASSESSMENT (HPI)
Pt c/o feeling that something is \"stuck in throat\" and hard to swallow, pt able to speak in full sentences

## 2025-02-01 NOTE — ED PROVIDER NOTES
Patient Seen in: [facility] Emergency Department      History     Chief Complaint   Patient presents with    Vomiting     Stated Complaint: pt states vomiting for the lst 2-3 weeks    Subjective:   HPI      64-year-old male presents reporting that over the last 2 to 3 weeks he has gradually had increasing difficulty swallowing solids and liquids.  He says he is unable to eat or drink anything at this time.  His wife is at the bedside and reports he has been losing weight.  He was unable to take his medications over the last couple of days as they get stuck.  He reports he tried eating a small piece of bread this morning and it feels like it is stuck in his upper esophagus.  [facility] documentation describes a previous balloon dilation of a distal esophageal stricture in 2019.  I do also sees made multiple calls to his gastroenterologist recently describing worsening symptoms.    Objective:     Past Medical History:    Pre-diabetes    Problems with swallowing              Past Surgical History:   Procedure Laterality Date    Eye surgery                  Social History     Socioeconomic History    Marital status:    Tobacco Use    Smoking status: Never    Smokeless tobacco: Never   Substance and Sexual Activity    Alcohol use: No    Drug use: No                  Physical Exam     ED Triage Vitals [02/01/25 0657]   /80   Pulse 65   Resp 18   Temp (!) 96.2 °F (35.7 °C)   Temp src Temporal   SpO2 100 %   O2 Device None (Room air)       Current Vitals:   Vital Signs  BP: 115/83  Pulse: 54  Resp: 18  Temp: (!) 96.2 °F (35.7 °C)  Temp src: Temporal  MAP (mmHg): 93    Oxygen Therapy  SpO2: 98 %  O2 Device: None (Room air)        Physical Exam  General:  Vitals as listed.  No acute distress   HEENT: Sclerae anicteric.  Conjunctivae show no pallor.  Dry mucous membranes  Neck: supple, no rigidity   Lungs: good air exchange and clear   Heart: regular rate rhythm and no murmur   Abdomen: Soft and nontender.   No abdominal masses.  No peritoneal signs   Extremities: no edema, normal peripheral pulses   Neuro: Alert oriented and nonfocal   Skin: no rashes or nodules    ED Course     Labs Reviewed   COMP METABOLIC PANEL (14) - Abnormal; Notable for the following components:       Result Value    Glucose 136 (*)     Bilirubin, Total 1.2 (*)     All other components within normal limits   POCT GLUCOSE - Abnormal; Notable for the following components:    POC Glucose 112 (*)     All other components within normal limits   CBC WITH DIFFERENTIAL WITH PLATELET   RAINBOW DRAW LAVENDER   RAINBOW DRAW LIGHT GREEN   RAINBOW DRAW BLUE                   MDM      64-year-old male presents reporting inability to eat or drink.  He says unable to take his medicine.    Additional history obtained by patient's wife who reports that he is no longer able to take his medications because they become stuck.  He has been losing weight.     Differential includes but is not limited to esophageal obstruction, dysphagia, dehydration, metabolic disturbances, a life threat.    CBC, CMP, Accu-Chek ordered for further evaluation.  1 L NS bolus.  GI consult    Spoke with [facility] who recommends admission for clear liquid diet and plan for endoscopy tomorrow.  Patient is agreeable.  Discussed with admitting physician.              Medical Decision Making      Disposition and Plan     Clinical Impression:  1. Dysphagia, unspecified type         Disposition:  Admit  2/1/2025 10:48 am    Follow-up:  No follow-up provider specified.        Medications Prescribed:  Current Discharge Medication List              Supplementary Documentation:         Hospital Problems       Present on Admission  Date Reviewed: 3/18/2022            ICD-10-CM Noted POA    * (Principal) Dysphagia, unspecified type R13.10 8/3/2019 Unknown

## 2025-02-01 NOTE — CONSULTS
[facility] IP Report of Consultation Gastroenterology    2/2/2025    Ramiro FARZANA Olegamadouradha  male   Leon Motley DO     [id number]  9/10/1960 Primary Care Physician  George Schrader MD     Reason for Consultation: oropharyngeal dysphagia     HPI: Patient is 64 male presents reporting that over the last 2 to 3 weeks he has gradually had increasing difficulty swallowing solids and liquids. He says he is unable to eat or drink anything at this time. His wife is at the bedside and reports he has been losing weight. He was unable to take his medications over the last couple of days as they get stuck. He reports he tried eating a small piece of bread this morning and it feels like it is stuck in his upper esophagus. [facility] documentation describes a previous balloon dilation of a distal esophageal stricture in 2019.     PROBLEM LIST:     Patient Active Problem List   Diagnosis    Osteoarthrosis, unspecified whether generalized or localized, lower leg    Globus pharyngeus    Laryngopharyngeal reflux    Dysphagia    Dysphagia, unspecified type    Diabetes type 2, controlled (HCC)    Seasonal allergic rhinitis due to pollen       PATIENT HISTORY:     Past Medical History:    Pre-diabetes    Problems with swallowing      Past Surgical History:   Procedure Laterality Date    Eye surgery        Family History   Problem Relation Age of Onset    Heart Attack Father     Stroke Father     Diabetes Mother     Heart Attack Mother     Heart Attack Sister     Diabetes Sister       Social History     Socioeconomic History    Marital status:    Tobacco Use    Smoking status: Never    Smokeless tobacco: Never   Substance and Sexual Activity    Alcohol use: No    Drug use: No     Social Drivers of Health     Food Insecurity: No Food Insecurity (2/1/2025)    Food Insecurity     Food Insecurity: Never true   Transportation Needs: No Transportation Needs (2/1/2025)    Transportation Needs     Lack of Transportation: No    Housing Stability: Low Risk  (2/1/2025)    Housing Stability     Housing Instability: No        Allergies;  Allergies[1]     Medications:    Current Facility-Administered Medications:     fluticasone propionate (Flonase) 50 MCG/ACT nasal suspension 2 spray, 2 spray, Nasal, Daily    glucose (Dex4) 15 GM/59ML oral liquid 15 g, 15 g, Oral, Q15 Min PRN **OR** glucose (Glutose) 40% oral gel 15 g, 15 g, Oral, Q15 Min PRN **OR** glucose-vitamin C (Dex-4) chewable tab 4 tablet, 4 tablet, Oral, Q15 Min PRN **OR** dextrose 50% injection 50 mL, 50 mL, Intravenous, Q15 Min PRN **OR** glucose (Dex4) 15 GM/59ML oral liquid 30 g, 30 g, Oral, Q15 Min PRN **OR** glucose (Glutose) 40% oral gel 30 g, 30 g, Oral, Q15 Min PRN **OR** glucose-vitamin C (Dex-4) chewable tab 8 tablet, 8 tablet, Oral, Q15 Min PRN    sodium chloride 0.9% infusion, , Intravenous, Continuous    heparin (Porcine) 5000 UNIT/ML injection 5,000 Units, 5,000 Units, Subcutaneous, Q8H KEENAN    acetaminophen (Tylenol Extra Strength) tab 500 mg, 500 mg, Oral, Q4H PRN    ondansetron (Zofran) 4 MG/2ML injection 4 mg, 4 mg, Intravenous, Q6H PRN    prochlorperazine (Compazine) 10 MG/2ML injection 5 mg, 5 mg, Intravenous, Q8H PRN    insulin aspart (NovoLOG) 100 Units/mL FlexPen 1-5 Units, 1-5 Units, Subcutaneous, TID AC and HS    pantoprazole (Protonix) 40 mg in sodium chloride 0.9% PF 10 mL IV push, 40 mg, Intravenous, Q24H    brimonidine (Alphagan) 0.2 % ophthalmic solution 1 drop, 1 drop, Both Eyes, BID    sodium chloride (Daniela 128) 5 % ophthalmic solution 1 drop, 1 drop, Left Eye, Nightly    prednisoLONE (Pred Forte) 1 % ophthalmic suspension 1 drop, 1 drop, Left Eye, Daily    timolol (Timoptic) 0.5 % ophthalmic solution 1 drop, 1 drop, Both Eyes, BID     REVIEW OF SYSTEMS:   GENERAL: well developed, well nourished, in no apparent distress  HEENT: normocephalic; normal nose, pharynx and TM's  EYES: PERRLA, EOMI, sclera anicteric, conjunctiva normal; fundi normal  NECK:  supple, FROM, no nodes, no JVD, no thyromegaly, no carotid bruits  RESPIRATORY: clear to percussion and auscultation  CARDIOVASCULAR: S1, S2 normal, RRR; no S3, no S4; no click; murmur negative  ABDOMEN: normal, active bowel sounds, no masses, HSM or tenderness  RECTAL: ---  EXTREMITIES: no cyanosis, clubbing or edema, peripheral pulses intact  PSYCHIATRIC: alert, oriented times 3        EXAM:   /86 (BP Location: Right arm)   Pulse 55   Temp 97.8 °F (36.6 °C) (Oral)   Resp 16   Ht 5' 11\" (1.803 m)   Wt 174 lb (78.9 kg)   SpO2 95%   BMI 24.27 kg/m²  Body mass index is 24.27 kg/m².  GENERAL: Well developed, well nourished, in no obvious distress.   ABDOMEN: Bowel sounds normoactive. Soft, no organomegaly or masses appreciated. Nontender.   EXTREMITIES: Without cyanosis. No peripheral edema appreciated.   RECTAL: Deferred.   NEURO: Motor and Gait grossly intact. Alert and Oriented x 3.        LAB/IMAGING RESULTS:     Lab Results   Component Value Date    WBC 6.1 02/02/2025    HGB 16.0 02/02/2025    HCT 43.7 02/02/2025    .0 02/02/2025     [unfilled]  Lab Results   Component Value Date    WBC 6.1 02/02/2025    HGB 16.0 02/02/2025    HCT 43.7 02/02/2025    .0 02/02/2025    CREATSERUM 0.95 02/02/2025    BUN 11 02/02/2025     02/02/2025    K 3.7 02/02/2025     02/02/2025    CO2 22.0 02/02/2025    GLU 78 02/02/2025    CA 9.2 02/02/2025    ALB 4.5 02/01/2025    ALKPHO 78 02/01/2025    BILT 1.2 02/01/2025    TP 7.5 02/01/2025    AST 20 02/01/2025    ALT 23 02/01/2025    MG 1.8 02/02/2025    PGLU 85 02/02/2025     Procedure Data:   8/4/19  REFERRING PHYSICIAN: Dr. Ortiz  Medications:  MAC  PREOPERATIVE DIAGNOSIS             Dysphagia             Abnormal ct of the esophagus             Weight loss  POSTOPERATIVE DIAGNOSIS:  Dilation and spasms in the esophagus with large amount of retained fluid and particulate material, which was removed via suction.  This appearance is highly  suggestive of achalasia.  Tight gastroesophageal junction, requiring gentle pushing of the scope to traverse the GE junction.  No stricture or masses at the GE junction.  Biopsies done of the distal esophagus to assess for eosinophilic esophagitis.  Normal stomach with normal GE junction noted on retroflexion of the scope.  Normal duodenum.  Empiric dilation of the esophagus done to 20 mm with a balloon with no obvious disruption of the esophageal mucosa.     PROCEDURE PERFORMED:  Esophagogastroduodenoscopy with biopsy and esophogeal balloon dilation  Endoscopist:  Mg Tierney MD      PROCEDURE AND FINDINGS: The patient was placed into the left lateral decubitus after informed consent was obtained.  All questions were answered.  An updated history and physical were performed and an ASA score of 2 was assigned.  Informed consent was obtained prior to the procedure.  Complications were reviewed including risks of bleeding and perforation.         The Olympus video gastroscope was introduced into the mouth and passed into the esophogus after administration of MAC sedation.  The entire examined esophagus was remarkable for dilation and spasms in the esophagus with large amount of retained fluid and particulate material, which was removed via suction.  This appearance is highly suggestive of achalasia. There was a tight gastroesophageal junction, requiring gentle pushing of the scope to traverse the GE junction.  No stricture or masses at the GE junction.  Biopsies were done of the distal esophagus to assess for eosinophilic esophagitis. The entire examined stomach,  including retroflexion view which was remarkable for normal.  The  duodenum was examined to the third portion and was normal.             The scope was then pulled back into the esophogus.    Esophogeal balloon dilation was performed.   An 18, 19 and 20 mm balloon were each inflated across the gastroesophogeal junction for 30 seconds. There was resistance  to a each balloon size.  No obvious disruption of the esophageal mucosa was noted.             The gastroscope was removed from the patient.  The procedure was completed. The patient tolerated the procedure well.    Mg Tierney MD, Gastroenterologist        [facility] inpt consult  Then 3 weeks prior to 8/2019 hospitalization he began to note dysphagia to liquids intermittently as well.    Then was admitted at [place] 8/2019 due to significant dysphagia to solids and liquids. Then he was transferred here.    He underwent the following inpatient evaluation here:    Esophagram as inpatient here 8/2019 noted:  IMPRESSION:   1. No specific evidence for achalasia. There is a benign appearing distal esophageal  stricture that is flow-limiting and obstructive to a barium pill. A reflux stricture can  not be ruled out. Correlate with endoscopy and manometry.  2. Moderate to severe esophageal dysmotility disorder. The esophagus empties in the  upright position.    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    EGD as inpatient here 8/2019 noted:  Findings:   The lumen of the middle third of the esophagus was mildly dilated.   There was tortuous esophagus at the lower third of the esophagus leading   to GE junction, however, adult EGD scope was able to pass to the stomach   without resistance. A TTS dilator was passed through the scope. Dilation   with an 18-19-20 mm balloon dilator was performed to 20 mm. One column   of minimal mucosal tear was observed, indicating successful dilation.   Distal esophagus was carefully examined after dilation and there was no   perforation. Estimated blood loss was minimal. Biopsies were taken at   the distal esophagus (above GE junction) and mid esophagus with a cold   forceps for histology to assess for reflux or eosinophilic esophagitis   changes. Estimated blood loss was minimal.   The entire examined stomach was normal.   The examined duodenum was normal.    Post-Operative Diagnosis - Minimally tortuous esophagus at the lower third of   the esophagus above the GE junction without   significant stricture. Upstream mild dilation in the   mid esophagus. S/p TTS balloon dilation to 20 mm.   - Normal stomach and duodenum.     Path noted:  Upper GI endoscopic biopsies;        Mid esophagus:             -  Squamous epithelium without diagnostic abnormality.        Distal esophagus:             -  Squamous epithelium with features consistent with mild reflux   esophagitis.     ASSESSMENT AND PLAN:   Oropharyngeal dysphagia   Chronic GERD    Received call from ED. Patient has dysphagia. Seen by outpatient [facility]  Will plan for EGD under MAC today  NPO for now   Case d/w wife at bedside       AIDE CARROLL   GASTROENTEROLOGY             [1] No Known Allergies

## 2025-02-02 ENCOUNTER — ANESTHESIA (OUTPATIENT)
Dept: ENDOSCOPY | Facility: HOSPITAL | Age: 65
End: 2025-02-02
Payer: COMMERCIAL

## 2025-02-02 VITALS
DIASTOLIC BLOOD PRESSURE: 85 MMHG | OXYGEN SATURATION: 100 % | HEIGHT: 71 IN | RESPIRATION RATE: 18 BRPM | TEMPERATURE: 97 F | HEART RATE: 56 BPM | WEIGHT: 174 LBS | SYSTOLIC BLOOD PRESSURE: 130 MMHG | BODY MASS INDEX: 24.36 KG/M2

## 2025-02-02 LAB
ANION GAP SERPL CALC-SCNC: 14 MMOL/L (ref 0–18)
BASOPHILS # BLD AUTO: 0.04 X10(3) UL (ref 0–0.2)
BASOPHILS NFR BLD AUTO: 0.7 %
BUN BLD-MCNC: 11 MG/DL (ref 9–23)
CALCIUM BLD-MCNC: 9.2 MG/DL (ref 8.7–10.6)
CHLORIDE SERPL-SCNC: 106 MMOL/L (ref 98–112)
CO2 SERPL-SCNC: 22 MMOL/L (ref 21–32)
CREAT BLD-MCNC: 0.95 MG/DL
EGFRCR SERPLBLD CKD-EPI 2021: 90 ML/MIN/1.73M2 (ref 60–?)
EOSINOPHIL # BLD AUTO: 0.2 X10(3) UL (ref 0–0.7)
EOSINOPHIL NFR BLD AUTO: 3.3 %
ERYTHROCYTE [DISTWIDTH] IN BLOOD BY AUTOMATED COUNT: 12.4 %
GLUCOSE BLD-MCNC: 123 MG/DL (ref 70–99)
GLUCOSE BLD-MCNC: 78 MG/DL (ref 70–99)
GLUCOSE BLD-MCNC: 85 MG/DL (ref 70–99)
HCT VFR BLD AUTO: 43.7 %
HGB BLD-MCNC: 16 G/DL
IMM GRANULOCYTES # BLD AUTO: 0.02 X10(3) UL (ref 0–1)
IMM GRANULOCYTES NFR BLD: 0.3 %
LYMPHOCYTES # BLD AUTO: 1.51 X10(3) UL (ref 1–4)
LYMPHOCYTES NFR BLD AUTO: 24.9 %
MAGNESIUM SERPL-MCNC: 1.8 MG/DL (ref 1.6–2.6)
MCH RBC QN AUTO: 30.4 PG (ref 26–34)
MCHC RBC AUTO-ENTMCNC: 36.6 G/DL (ref 31–37)
MCV RBC AUTO: 82.9 FL
MONOCYTES # BLD AUTO: 0.36 X10(3) UL (ref 0.1–1)
MONOCYTES NFR BLD AUTO: 5.9 %
NEUTROPHILS # BLD AUTO: 3.93 X10 (3) UL (ref 1.5–7.7)
NEUTROPHILS # BLD AUTO: 3.93 X10(3) UL (ref 1.5–7.7)
NEUTROPHILS NFR BLD AUTO: 64.9 %
OSMOLALITY SERPL CALC.SUM OF ELEC: 292 MOSM/KG (ref 275–295)
PLATELET # BLD AUTO: 152 10(3)UL (ref 150–450)
POTASSIUM SERPL-SCNC: 3.7 MMOL/L (ref 3.5–5.1)
RBC # BLD AUTO: 5.27 X10(6)UL
SODIUM SERPL-SCNC: 142 MMOL/L (ref 136–145)
WBC # BLD AUTO: 6.1 X10(3) UL (ref 4–11)

## 2025-02-02 PROCEDURE — 88305 TISSUE EXAM BY PATHOLOGIST: CPT | Performed by: INTERNAL MEDICINE

## 2025-02-02 PROCEDURE — 0DB58ZX EXCISION OF ESOPHAGUS, VIA NATURAL OR ARTIFICIAL OPENING ENDOSCOPIC, DIAGNOSTIC: ICD-10-PCS | Performed by: INTERNAL MEDICINE

## 2025-02-02 PROCEDURE — 85025 COMPLETE CBC W/AUTO DIFF WBC: CPT | Performed by: HOSPITALIST

## 2025-02-02 PROCEDURE — 82962 GLUCOSE BLOOD TEST: CPT

## 2025-02-02 PROCEDURE — 0DB68ZX EXCISION OF STOMACH, VIA NATURAL OR ARTIFICIAL OPENING ENDOSCOPIC, DIAGNOSTIC: ICD-10-PCS | Performed by: INTERNAL MEDICINE

## 2025-02-02 PROCEDURE — 83735 ASSAY OF MAGNESIUM: CPT | Performed by: HOSPITALIST

## 2025-02-02 PROCEDURE — 0D758ZZ DILATION OF ESOPHAGUS, VIA NATURAL OR ARTIFICIAL OPENING ENDOSCOPIC: ICD-10-PCS | Performed by: INTERNAL MEDICINE

## 2025-02-02 PROCEDURE — 80048 BASIC METABOLIC PNL TOTAL CA: CPT | Performed by: HOSPITALIST

## 2025-02-02 RX ORDER — PANTOPRAZOLE SODIUM 40 MG/1
40 TABLET, DELAYED RELEASE ORAL
Qty: 112 TABLET | Refills: 0 | Status: SHIPPED | OUTPATIENT
Start: 2025-02-02 | End: 2025-03-30

## 2025-02-02 RX ORDER — SODIUM CHLORIDE, SODIUM LACTATE, POTASSIUM CHLORIDE, CALCIUM CHLORIDE 600; 310; 30; 20 MG/100ML; MG/100ML; MG/100ML; MG/100ML
INJECTION, SOLUTION INTRAVENOUS CONTINUOUS
Status: DISCONTINUED | OUTPATIENT
Start: 2025-02-02 | End: 2025-02-02

## 2025-02-02 RX ORDER — METOCLOPRAMIDE HYDROCHLORIDE 5 MG/ML
INJECTION INTRAMUSCULAR; INTRAVENOUS AS NEEDED
Status: DISCONTINUED | OUTPATIENT
Start: 2025-02-02 | End: 2025-02-02 | Stop reason: SURG

## 2025-02-02 RX ORDER — LIDOCAINE HYDROCHLORIDE 10 MG/ML
INJECTION, SOLUTION EPIDURAL; INFILTRATION; INTRACAUDAL; PERINEURAL AS NEEDED
Status: DISCONTINUED | OUTPATIENT
Start: 2025-02-02 | End: 2025-02-02 | Stop reason: SURG

## 2025-02-02 RX ORDER — MAGNESIUM SULFATE HEPTAHYDRATE 40 MG/ML
2 INJECTION, SOLUTION INTRAVENOUS ONCE
Status: COMPLETED | OUTPATIENT
Start: 2025-02-02 | End: 2025-02-02

## 2025-02-02 RX ORDER — CARBOXYMETHYLCELLULOSE SODIUM 5 MG/ML
1 SOLUTION/ DROPS OPHTHALMIC AS NEEDED
Status: SHIPPED | COMMUNITY
Start: 2025-02-02

## 2025-02-02 RX ORDER — NALOXONE HYDROCHLORIDE 0.4 MG/ML
0.08 INJECTION, SOLUTION INTRAMUSCULAR; INTRAVENOUS; SUBCUTANEOUS ONCE AS NEEDED
Status: DISCONTINUED | OUTPATIENT
Start: 2025-02-02 | End: 2025-02-02

## 2025-02-02 RX ORDER — SODIUM CHLORIDE, SODIUM LACTATE, POTASSIUM CHLORIDE, CALCIUM CHLORIDE 600; 310; 30; 20 MG/100ML; MG/100ML; MG/100ML; MG/100ML
INJECTION, SOLUTION INTRAVENOUS CONTINUOUS PRN
Status: DISCONTINUED | OUTPATIENT
Start: 2025-02-02 | End: 2025-02-02 | Stop reason: SURG

## 2025-02-02 RX ADMIN — LIDOCAINE HYDROCHLORIDE 50 MG: 10 INJECTION, SOLUTION EPIDURAL; INFILTRATION; INTRACAUDAL; PERINEURAL at 08:07:00

## 2025-02-02 RX ADMIN — SODIUM CHLORIDE, SODIUM LACTATE, POTASSIUM CHLORIDE, CALCIUM CHLORIDE: 600; 310; 30; 20 INJECTION, SOLUTION INTRAVENOUS at 07:55:00

## 2025-02-02 RX ADMIN — LIDOCAINE HYDROCHLORIDE 50 MG: 10 INJECTION, SOLUTION EPIDURAL; INFILTRATION; INTRACAUDAL; PERINEURAL at 08:12:00

## 2025-02-02 RX ADMIN — METOCLOPRAMIDE HYDROCHLORIDE 10 MG: 5 INJECTION INTRAMUSCULAR; INTRAVENOUS at 08:12:00

## 2025-02-02 RX ADMIN — ONDANSETRON 4 MG: 2 INJECTION INTRAMUSCULAR; INTRAVENOUS at 08:12:00

## 2025-02-02 NOTE — PLAN OF CARE
Problem: Patient/Family Goals  Goal: Patient/Family Long Term Goal  Description: Patient's Long Term Goal: Stay out of the hospital when discharged     Interventions:  - Attend follow up appointments as needed  - Follow medication regimen at home  - See additional Care Plan goals for specific interventions  Outcome: Not Progressing  Goal: Patient/Family Short Term Goal  Description: Patient's Short Term Goal: To go home    Interventions:   - EGD 2/2  - NPO  - IV fluids  - See additional Care Plan goals for specific interventions  Outcome: Not Progressing     Problem: GASTROINTESTINAL - ADULT  Goal: Minimal or absence of nausea and vomiting  Description: INTERVENTIONS:  - Maintain adequate hydration with IV or PO as ordered and tolerated  - Nasogastric tube to low intermittent suction as ordered  - Evaluate effectiveness of ordered antiemetic medications  - Provide nonpharmacologic comfort measures as appropriate  - Advance diet as tolerated, if ordered  - Obtain nutritional consult as needed  - Evaluate fluid balance  Outcome: Progressing     Problem: Impaired Swallowing  Goal: Minimize aspiration risk  Description: Interventions:  - Patient should be alert and upright for all feedings (90 degrees preferred)  - Offer food and liquids at a slow rate  - No straws  - Encourage small bites of food and small sips of liquid  - Offer pills one at a time, crush or deliver with applesauce as needed  - Discontinue feeding and notify MD (or speech pathologist) if coughing or persistent throat clearing or wet/gurgly vocal quality is noted  Outcome: Not Progressing

## 2025-02-02 NOTE — ANESTHESIA POSTPROCEDURE EVALUATION
[facility]    Ramiro Nichols Patient Status:  Inpatient   Age/Gender 64 year old male MRN [id number]   Location [facility] 3NW-A Attending Ky Estrada MD   Hosp Day # 1 PCP George Schrader MD     2  Anesthesia Post-op Note    ESOPHAGOGASTRODUODENOSCOPY (EGD) with dilation to 20mm and biopsies    Procedure Summary       Date: 02/02/25 Room / Location:  ENDOSCOPY 03 / [facility]    Anesthesia Start: 0803 Anesthesia Stop: 0829    Procedure: ESOPHAGOGASTRODUODENOSCOPY (EGD) with dilation to 20mm and biopsies Diagnosis:       Dysphagia      (esophageal stricture, gastritis)    Surgeons: Leon Motley DO Anesthesiologist: Jayesh Acosta MD    Anesthesia Type: MAC ASA Status: 3            Anesthesia Type: MAC    Vitals Value Taken Time   /69 02/02/25 0845   Temp  02/02/25 0946   Pulse 61 02/02/25 0845   Resp 12 02/02/25 0835   SpO2 100 % 02/02/25 0845           Patient Location: Endoscopy    Anesthesia Type: MAC    Airway Patency: patent    Postop Pain Control: adequate    Mental Status: mildly sedated but able to meaningfully participate in the post-anesthesia evaluation    Nausea/Vomiting: none    Cardiopulmonary/Hydration status: stable euvolemic    Complications: no apparent anesthesia related complications    Postop vital signs: stable    Dental Exam: Unchanged from Preop    Patient to be discharged from PACU when criteria met.

## 2025-02-02 NOTE — PLAN OF CARE
Pt alert and oriented x4. VSS on room air. SB on tele in the 50s.   wdl. Pt denies chest pain, sob, nausea or emesis. Left eye sclera red, pt receiving eye drops per order. PT abdomen soft, flat, nontender. NPO at 0000 for egd in am. Voids freely in adequate amounts.  2 person skin check done with Tamara rn:skin intact.  0.9 NS infusing per order. Pt and family updated on poc, questions and concerns address. Call light within reach.

## 2025-02-02 NOTE — DISCHARGE INSTRUCTIONS
*Soft diet for 2 months  *Eat in upright position     *FOR ANY QUESTIONS AND CONCERNS ,PLEASE CALL M.D.*    Continue pantoprazole 40 mg po bid for 8 weeks then daily after.

## 2025-02-02 NOTE — OPERATIVE REPORT
EGD Operative Report    Ramiro Nichols Patient Status:  Inpatient    9/10/1960 MRN [id number]   [facility] ENDOSCOPY [facility] Attending Ky Estrada MD   Hosp Day #   1 PCP George Schrader MD       Pre-op Diagnosis: Dysphagia [R13.10]     Post-Op Diagnosis:    ESOPHAGUS: small amount of  food bolus present in middle third of esophagus. Gently pushed the esophagus into stomach with EGD scope. Severe narrowing with stricture present in distal esophagus near GE junction. Balloon dilation, maximum balloon size of 20 mm was performed. Narrowing also felt at upper esophageal sphincter muscle area. Balloon dilation, maximum balloon size of 20 mm was performed at Upper esophageal sphincter muscle area. Mild ringed appearance of entire esophagus. Suspicious for EoE. Random esophageal biopsies taken    STOMACH:  moderate diffuse erosive gastritis. Biopsied for H. Pylori infection    DUODENUM:  Normal duodenum    Procedure Performed: EGD     Informed Consent: Informed consent for both the procedure and sedation were obtained from the patient. The potentially life-threatening complications of sedation, bleeding,  Perforation, transfusion or repeat endoscopy were reviewed along with the possible need for hospitalization, surgical management, transfusion or repeat endoscopy should one of these complications arise. The patient understands and is agreeable to proceed.  Sedation Type: MAC-Patient received sedation with monitored anesthesia provided by an anesthesiologist  Moderate Sedation Time: None.  Deep sedation provided by anesthesia.  Procedure Description: The patient was placed in the left lateral decubitus position.  A bite block was placed in the patient’s mouth.  The endoscope was inserted through the mouth and advanced under direct visualization  to the 3rd portion of the duodenum and was then withdrawn to examine the duodenal bulb and gastric antrum.  The endoscope was then retroflexed to examine the angulus, GE junction, cardia, body and fundus and then withdrawn to examine the esophagus. The endoscope was then removed from the patient. The patient tolerated the procedure well with no immediate complications and was transferred to the recovery area in stable condition.  Findings:    ESOPHAGUS: small amount of  food bolus present in middle third of esophagus. Gently pushed the esophagus into stomach with EGD scope. Severe narrowing with stricture present in distal esophagus near GE junction. Balloon dilation, maximum balloon size of 20 mm was performed. Narrowing also felt at upper esophageal sphincter muscle area. Balloon dilation, maximum balloon size of 20 mm was performed at Upper esophageal sphincter muscle area. Mild ringed appearance of entire esophagus. Suspicious for EoE. Random esophageal biopsies taken    STOMACH:  moderate diffuse erosive gastritis. Biopsied for H. Pylori infection    DUODENUM:  Normal duodenum  Recommendations:   Await pathology results   Pantoprazole 40 mg twice daily for 8 weeks and then 40 mg daily   Will repeat EGD in 3 months from now for surveillance and possible more dilation of esophageal strictures   Soft diet for 2 months   Ok to discharge patient home today     Discharge:  The patient was given an after visit summary detailing the procedure, findings, recommendations and follow up plans.  Leon Motley DO  2/2/2025  8:33 AM

## 2025-02-02 NOTE — PROGRESS NOTES
Alert & oriented x4.Denies pain.No flatus.Kept NPO.Up in room Plan  f care discussed with patient.To Endoscopy for EGD per cart.

## 2025-02-02 NOTE — DISCHARGE SUMMARY
[facility]   part of [facility]    DISCHARGE SUMMARY     Ramiro Nichols Patient Status:  Inpatient    9/10/1960 MRN [id number]   Location [facility] 3NW-A Attending Ky Estrada MD   Hosp Day # 1 PCP George Schrader MD     Date of Admission: 2025  Date of Discharge:   Discharge Disposition: Home or Self Care    Discharge Diagnosis: ***    History of Present Illness: ***    Brief Synopsis: ***    Lace+ Score: 42  59-90 High Risk  29-58 Medium Risk  0-28   Low Risk.    Procedures during hospitalization:   ***    Incidental or significant findings and recommendations (brief descriptions):  ***    Lab/Test results pending at Discharge:   ***    Consultants:  ***    Discharge Medication List:     Discharge Medications        START taking these medications        Instructions Prescription details   pantoprazole 40 MG Tbec  Commonly known as: Protonix      Take 1 tablet (40 mg total) by mouth 2 (two) times daily before meals.   Stop taking on: 2025  Quantity: 112 tablet  Refills: 0            CONTINUE taking these medications        Instructions Prescription details   Accu-Chek FastClix Lancets Misc      Test blood glucose once daily   Quantity: 1 Box  Refills: 12     Accu-Chek Guide Strp      Test blood glucose once daily   Quantity: 50 strip  Refills: 12     atorvastatin 40 MG Tabs  Commonly known as: Lipitor      Take 1 tablet (40 mg total) by mouth daily.   Quantity: 90 tablet  Refills: 0     brimonidine 0.2 % Soln  Commonly known as: Alphagan      Place 1 drop into both eyes 2 (two) times daily.   Quantity: 15 mL  Refills: 3     cholecalciferol 50 MCG (2000 UT) Tabs  Commonly known as: Vitamin D3      TAKE 1 TABLET BY MOUTH EVERY DAY   Quantity: 90 tablet  Refills: 0     Claritin-D 24 Hour  MG Tb24  Generic drug: loratadine-pseudoephedrine ER      Take 1 tablet by mouth daily.   Quantity: 30 tablet  Refills: 0     metFORMIN  MG Tb24  Commonly known as:  Glucophage XR      Take 1 tablet (500 mg total) by mouth 2 (two) times daily with meals.   Quantity: 60 tablet  Refills: 3     Daniela 128 5 % Soln  Generic drug: sodium chloride      Place 1 drop into the left eye every evening.   Quantity: 30 mL  Refills: 0     prednisoLONE 1 % Susp  Commonly known as: Pred Forte      Place 1 drop into the left eye daily.   Quantity: 15 mL  Refills: 3     Refresh Tears 0.5 % Soln  Generic drug: carboxymethylcellulose sodium      Place 1 drop into both eyes as needed.   Refills: 0     timolol 0.5 % Soln  Commonly known as: Timoptic      Instill 1 drop into both eyes two times daily   Quantity: 15 mL  Refills: 3            STOP taking these medications      omeprazole 20 MG Cpdr  Commonly known as: PriLOSEC                  Where to Get Your Medications        These medications were sent to [facility]/pharmacy #[id number] - [place], [place] - [address], [phone], [phone]  [address] [address]      Phone: [phone]   pantoprazole 40 MG [facility]         [place]PMP reviewed: ***    Follow-up appointment:   Leon Motley DO  [address]  [place] [address] [phone]    Follow up in 3 month(s)      George Schrader MD  [address]  [facility] [facility] [id number] [phone]    Follow up in 1 week(s)      Appointments for Next 30 Days 2/2/2025 - 3/4/2025      None            Vital signs:  Temp:  [97.2 °F (36.2 °C)-98.5 °F (36.9 °C)] 97.2 °F (36.2 °C)  Pulse:  [54-66] 61  Resp:  [12-21] 16  BP: (120-137)/(61-86) 134/67  SpO2:  [95 %-100 %] 100 %    Physical Exam:    General: No acute distress.   Respiratory: Clear to auscultation bilaterally. No wheezes. No rhonchi.  Cardiovascular: S1, S2. Regular rate and rhythm. No murmurs, rubs or gallops.   Abdomen: Soft, nontender, nondistended.  Positive bowel sounds. No rebound or guarding.  Neurologic: No focal neurological deficits.   Musculoskeletal: Moves all  extremities.  Extremities: No edema.  -----------------------------------------------------------------------------------------------  PATIENT DISCHARGE INSTRUCTIONS: See electronic chart    Ky Estrada MD 2/2/2025    Time spent:  > 30 minutes

## 2025-02-02 NOTE — PROGRESS NOTES
NURSING DISCHARGE NOTE    Discharged Home via Wheelchair.  Accompanied by Spouse and Support staff  Belongings Taken by patient/family    Verbal and written discharge instructions given  to patient .Verbalized understanding.Tolerated soft diet.Up in room and hallway .To home in stable condition.

## 2025-02-02 NOTE — PROGRESS NOTES
Received from Endoscopy Dept  per cart.Alert & oriented x4.Denies pain . No nausea or vomiting.Due  to void .Will start on full liquid diet.Plan of care discussed with patient.

## (undated) DEVICE — SYRINGE/GUAGE ASSEMBLY

## (undated) DEVICE — 3M™ RED DOT™ MONITORING ELECTRODE WITH FOAM TAPE AND STICKY GEL, 50/BAG, 20/CASE, 72/PLT 2570: Brand: RED DOT™

## (undated) DEVICE — ENDOSCOPY PACK UPPER: Brand: MEDLINE INDUSTRIES, INC.

## (undated) DEVICE — 1200CC GUARDIAN II: Brand: GUARDIAN

## (undated) DEVICE — KIT CUSTOM ENDOPROCEDURE STERIS

## (undated) DEVICE — Device: Brand: DEFENDO AIR/WATER/SUCTION AND BIOPSY VALVE

## (undated) DEVICE — 3M™ RED DOT™ MONITORING ELECTRODE WITH FOAM TAPE AND STICKY GEL 2570-3, 3/BAG, 200/CASE, 54/PLT: Brand: RED DOT™

## (undated) DEVICE — ESOPHAGEAL/COLONIC WIREGUIDED BALLOON DILATATION CATHETER: Brand: CRE WIREGUIDED

## (undated) DEVICE — BITEBLOCK ENDOSCP 60FR MAXI STRP

## (undated) DEVICE — FORCEP BIOPSY RJ4 LG CAP W/ND

## (undated) DEVICE — 10FT COMBINED O2 DELIVERY/CO2 MONITORING. FILTER WITH MICROSTREAM TYPE LUER: Brand: DUAL ADULT NASAL CANNULA

## (undated) DEVICE — DEVICE INFL 60ML 15ATM PRSS COOK SPHR

## (undated) DEVICE — VALVE AIR/H20 DEFENDO SCT BX

## (undated) DEVICE — GIJAW SINGLE-USE BIOPSY FORCEPS WITH NEEDLE: Brand: GIJAW

## (undated) DEVICE — V2 SPECIMEN COLLECTION MANIFOLD KIT: Brand: NEPTUNE

## (undated) DEVICE — FILTERLINE NASAL ADULT O2/CO2

## (undated) DEVICE — HERCULES 3 STAGE BALLOON ESOPHAGEAL: Brand: HERCULES

## (undated) NOTE — LETTER
[address]  [address]  Authorization for Surgical Operation and Procedure     Date:___________                                                                                                         Time:__________  I hereby authorize Surgeon(s):  Leon Motley DO, my physician and his/her assistants (if applicable), which may include medical students, residents, and/or fellows, to perform the following surgical operation/ procedure and administer such anesthesia as may be determined necessary by my physician:  Operation/Procedure name (s) Procedure(s):  ESOPHAGOGASTRODUODENOSCOPY (EGD) on Ramiro Nichols   2.   I recognize that during the surgical operation/procedure, unforeseen conditions may necessitate additional or different procedures than those listed above.  I, therefore, further authorize and request that the above-named surgeon, assistants, or designees perform such procedures as are, in their judgment, necessary and desirable.    3.   My surgeon/physician has discussed prior to my surgery the potential benefits, risks and side effects of this procedure; the likelihood of achieving goals; and potential problems that might occur during recuperation.  They also discussed reasonable alternatives to the procedure, including risks, benefits, and side effects related to the alternatives and risks related to not receiving this procedure.  I have had all my questions answered and I acknowledge that no guarantee has been made as to the result that may be obtained.    4.   Should the need arise during my operation/procedure, which includes change of level of care prior to discharge, I also consent to the administration of blood and/or blood products.  Further, I understand that despite careful testing and screening of blood or blood products by collecting agencies, I may still be subject to ill effects as a result of receiving a blood transfusion and/or blood products.   The following are some, but not all, of the potential risks that can occur: fever and allergic reactions, hemolytic reactions, transmission of diseases such as Hepatitis, AIDS and Cytomegalovirus (CMV) and fluid overload.  In the event that I wish to have an autologous transfusion of my own blood, or a directed donor transfusion, I will discuss this with my physician.  Check only if Refusing Blood or Blood Products  I understand refusal of blood or blood products as deemed necessary by my physician may have serious consequences to my condition to include possible death. I hereby assume responsibility for my refusal and release the hospital, its personnel, and my physicians from any responsibility for the consequences of my refusal.          o  Refuse      5.   I authorize the use of any specimen, organs, tissues, body parts or foreign objects that may be removed from my body during the operation/procedure for diagnosis, research or teaching purposes and their subsequent disposal by hospital authorities.  I also authorize the release of specimen test results and/or written reports to my treating physician on the hospital medical staff or other referring or consulting physicians involved in my care, at the discretion of the Pathologist or my treating physician.    6.   I consent to the photographing or videotaping of the operations or procedures to be performed, including appropriate portions of my body for medical, scientific, or educational purposes, provided my identity is not revealed by the pictures or by descriptive texts accompanying them.  If the procedure has been photographed/videotaped, the surgeon will obtain the original picture, image, videotape or CD.  The hospital will not be responsible for storage, release or maintenance of the picture, image, tape or CD.    7.   I consent to the presence of a  or observers in the operating room as deemed necessary by my physician or their  designees.    8.   I recognize that in the event my procedure results in extended X-Ray/fluoroscopy time, I may develop a skin reaction.    9. If I have a Do Not Attempt Resuscitation (DNAR) order in place, that status will be suspended while in the operating room, procedural suite, and during the recovery period unless otherwise explicitly stated by me (or a person authorized to consent on my behalf). The surgeon or my attending physician will determine when the applicable recovery period ends for purposes of reinstating the DNAR order.  10. Patients having a sterilization procedure: I understand that if the procedure is successful the results will be permanent and it will therefore be impossible for me to inseminate, conceive, or bear children.  I also understand that the procedure is intended to result in sterility, although the result has not been guaranteed.   11. I acknowledge that my physician has explained sedation/analgesia administration to me including the risk and benefits I consent to the administration of sedation/analgesia as may be necessary or desirable in the judgment of my physician.    I CERTIFY THAT I HAVE READ AND FULLY UNDERSTAND THE ABOVE CONSENT TO OPERATION and/or OTHER PROCEDURE.    _________________________________________  __________________________________  Signature of Patient     Signature of Responsible Person         ___________________________________         Printed Name of Responsible Person           _________________________________                 Relationship to Patient  _________________________________________  ______________________________  Signature of Witness          Date  Time      Patient Name: Ramiro YANES Olegjonna     : 9/10/1960                 Printed: 2025     Medical Record #: [id number]                     Page 1 of [address] [place] [address]  [address]    Consent for Anesthesia    I,  Ramiro Nichols agree to be cared for by an anesthesiologist, who is specially trained to monitor me and give me medicine to put me to sleep or keep me comfortable during my procedure    I understand that my anesthesiologist is not an employee or agent of [facility] [employer]. He or she works for [employer].    As the patient asking for anesthesia services, I agree to:  Allow the anesthesiologist (anesthesia doctor) to give me medicine and do additional procedures as necessary. Some examples are: Starting or using an “IV” to give me medicine, fluids or blood during my procedure, and having a breathing tube placed to help me breathe when I’m asleep (intubation). In the event that my heart stops working properly, I understand that my anesthesiologist will make every effort to sustain my life, unless otherwise directed by [facility] Do Not Resuscitate documents.  Tell my anesthesia doctor before my procedure:  If I am pregnant.  The last time that I ate or drank.  All of the medicines I take (including prescriptions, herbal supplements, and pills I can buy without a prescription (including street drugs/illegal medications). Failure to inform my anesthesiologist about these medicines may increase my risk of anesthetic complications.  If I am allergic to anything or have had a reaction to anesthesia before.  I understand how the anesthesia medicine will help me (benefits).  I understand that with any type of anesthesia medicine there are risks:  The most common risks are: nausea, vomiting, sore throat, muscle soreness, damage to my eyes, mouth, or teeth (from breathing tube placement).  Rare risks include: remembering what happened during my procedure, allergic reactions to medications, injury to my airway, heart, lungs, vision, nerves, or muscles and in extremely rare instances death.  My doctor has explained to me other choices available to me for my care  (alternatives).  Pregnant Patients (“epidural”):  I understand that the risks of having an epidural (medicine given into my back to help control pain during labor), include itching, low blood pressure, difficulty urinating, headache or slowing of the baby’s heart. Very rare risks include infection, bleeding, seizure, irregular heart rhythms and nerve injury.  Regional Anesthesia (“spinal”, “epidural”, & “nerve blocks”):  I understand that rare but potential complications include headache, bleeding, infection, seizure, irregular heart rhythms, and nerve injury.    I can change my mind about having anesthesia services at any time before I get the medicine.    _____________________________________________________________________________  Patient (or Representative) Signature/Relationship to Patient  Date   Time    _____________________________________________________________________________   Name (if used)    Language/Organization   Time    _____________________________________________________________________________  Anesthesiologist Signature     Date   Time  I have discussed the procedure and information above with the patient (or patient’s representative) and answered their questions. The patient or their representative has agreed to have anesthesia services.    _____________________________________________________________________________  Witness        Date   Time  I have verified that the signature is that of the patient or patient’s representative, and that it was signed before the procedure  Patient Name: Ramiro Nichols     : 9/10/1960                 Printed: 2025     Medical Record #: [id number]                     Page 2 of 2

## (undated) NOTE — ED AVS SNAPSHOT
THE [facility] SURGERY [address]    Phone:  [phone]    Fax:  [phone]           Mr. Newman Cami   MRN: [id number]    Department:  THE [facility] [place] SURGERY [facility]   Date of Visit:  5/22/ Insurance plans vary and the physician(s) referred by the Immediate Care may not be covered by your plan. Please contact your insurance company to determine coverage for follow-up care and referrals. [facility]  [address] If you have been prescribed any medication(s), please fill your prescription right away and begin taking the medication(s) as directed.     If the Immediate Care Provider has read X-rays, these will be re-interpreted by a radiologist.  If there is a signifi can help with your Affordable Care Act coverage, as well as all types of Medicaid plans. To get signed up and covered, please call [phone] and ask to get set up for an insurance coverage that is in-network with [facility]

## (undated) NOTE — LETTER
Martha Byrd [address]  [place], [address] [facility]    Consent for Operation  Date: __________________                                Time: _______________    1.  I authorize the performance upon [employer] the following operation:  Proce procedure has been videotaped, the surgeon will obtain the original videotape. The hospital will not be responsible for storage or maintenance of this tape.   7. For the purpose of advancing medical education, I consent to the admittance of observers to the STATEMENTS REQUIRING INSERTION OR COMPLETION WERE FILLED IN.     Signature of Patient:   ___________________________    When the patient is a minor or mentally incompetent to give consent:  Signature of person authorized to consent for patient: ____________ supplements, and pills I can buy without a prescription (including street drugs/illegal medications). Failure to inform my anesthesiologist about these medicines may increase my risk of anesthetic complications. iv.  If I am allergic to anything or have ha Anesthesiologist Signature     Date   Time  I have discussed the procedure and information above with the patient (or patient’s representative) and answered their questions. The patient or their representative has agreed to have anesthesia services.     ___

## (undated) NOTE — IP AVS SNAPSHOT
Patient Demographics     Address  [address]  [place]  [address] Phone  [phone] [facility])  [phone] (Mobile) *Preferred* E-mail Address  [email or web address]      Emergency Contact(s)     Name Relation Home Work [employer] prednisoLONE acetate 1 % Susp  Commonly known as:  PRED FORTE      PLACE 1 DROP INTO BOTH EYES DAILY.           REFRESH TEARS 0.5 % Soln  Generic drug:  Carboxymethylcellulose Sodium      INT 1 GTT IN OU BID          Timolol Maleate 0.5 % Soln  Commonly kno Glucose 112 70 - 99 mg/dL [facility]   Sodium 140 136 - 145 mmol/L — [facility]   Potassium 3.6 3.5 - 5.1 mmol/L [facility]   Chloride 111 98 - 112 mmol/L — [facility]   CO2 24.0 21.0 - 32.0 mmol/L — [facility]   Anion Gap 5 0 - 18 mmol/L [facility] esophagus as well as patchy nonspecific b/l airspace opacities within the lower lobes concerning for mild multifocal PNA/pneumonitis.        PMH  Past Medical History:   Diagnosis Date   • Diabetes [facility])    • Problems with swallowing         PSH  Past Surgi 01/30/18 : 184 lb (83.5 kg)  05/22/17 : 183 lb (83 kg)      Gen: alert, oriented, NAD  HEENT: NCAT, EOMI, MMM  Pulm: CTAB, normal respiratory effort  CV: RRR, nL S1/S2, no m/r/g  Abd: soft, NT/ND, BS+  MSK: moving all extremities, no LE edema  Neuro: CN II significant fluid or mucosal thickening. NECK GLANDS:  The parotid, submandibular, and thyroid glands are unremarkable. LYMPH NODES:  No pathological-appearing or enlarged lymph nodes. SKULL BASE:  Foramina are symmetric without bony erosion.   Biran Nair - s/p EGD which showed dilation and spasms in the esophagus with large amt retained fluid and particulate material, removed via suction. Findings highly suggestive of achalasia. Tight GE junction. Biopsies taken.  Empiric dilation of esophagus performed to  9/10/1960 MRN [id number]   [facility] 3SW-A Attending Sosa Lopes MD   Hosp Day # 0 PCP None Pcp     Reason for Consultation:[AK.1]  Dysphagia - high neck  Abnormal ct of the esophagus  Weight loss[AK. 2]    History of Present Illness: LARYNX:  The vocal cords are symmetric and without mass. SINUSES:  Limited views show no significant fluid or mucosal thickening. NECK GLANDS:  The parotid, submandibular, and thyroid glands are unremarkable.     LYMPH NODES:  No pathological-appearin PROCEDURE:  XR ESOPHAGUS (CPT=74220)     TECHNIQUE:  An esophagram was performed with fluoroscopy in the usual manner. COMPARISON:  None.      INDICATIONS:  R13.10 Dysphagia, unspecified     PATIENT STATED HISTORY: (As transcribed by Technologist)  Tim Finch •  Metoclopramide HCl (REGLAN) injection 10 mg, 10 mg, Intravenous, Q8H PRN  •  Pantoprazole Sodium (PROTONIX) 40 mg in Sodium Chloride 0.9 % 10 mL IV push, 40 mg, Intravenous, Q24H  •  glucose (DEX4) oral liquid 15 g, 15 g, Oral, Q15 Min PRN **OR** Glucos source Oral, resp. rate 16, height 5' 10\" (1.778 m), weight 170 lb (77.1 kg), SpO2 96 %. General: Appears alert, oriented x3 and in no acute distress. HEENT: Normal. No neck vein distention. Thyroid not enlarged. No lymphadenopathy.   CV: S1 and S2 no Thank you for allowing to participate in the care of this patient. Tamie Pascal  8/4/2019  7:21 AM[AK.1]      Electronically signed by Shelley Nascimento MD on 8/4/2019  8:58 AM   Attribution Sandhu    AK. 1 - Shelley Nascimento MD on 8/4/2019  7:21 AM  AK.2

## (undated) NOTE — IP AVS SNAPSHOT
[address]            (For Outpatient Use Only) Initial Admit Date: 8/3/2019   Inpt/Obs Admit Date: Inpt: N/A / Obs: 08/03/19   Discharge Date:    Hospital Acct:  [de-identified]   MRN: [de-identified]   CSN: [id number]   CEID: [id number] Hospital Account Financial Class: [facility] [place]    August 5, 2019

## (undated) NOTE — LETTER
8/13/2019          Donald Allred [address] [id number]    Dear Marissa Wright,       Here are the biopsy/pathology findings from your recent EGD (Upper  Endoscopy):        The esophageal biopsies were normal.     If you need an

## (undated) NOTE — LETTER
Martha Byrd [address]  [place], [address] [facility]    Consent for Operation  Date: __________________                                Time: _______________    1.  I authorize the performance upon [employer] the following operation:  Proce procedure has been videotaped, the surgeon will obtain the original videotape. The hospital will not be responsible for storage or maintenance of this tape.   7. For the purpose of advancing medical education, I consent to the admittance of observers to the STATEMENTS REQUIRING INSERTION OR COMPLETION WERE FILLED IN.     Signature of Patient:   ___________________________    When the patient is a minor or mentally incompetent to give consent:  Signature of person authorized to consent for patient: ____________ supplements, and pills I can buy without a prescription (including street drugs/illegal medications). Failure to inform my anesthesiologist about these medicines may increase my risk of anesthetic complications. iv.  If I am allergic to anything or have ha Anesthesiologist Signature     Date   Time  I have discussed the procedure and information above with the patient (or patient’s representative) and answered their questions. The patient or their representative has agreed to have anesthesia services.     ___